# Patient Record
Sex: FEMALE | Race: WHITE | NOT HISPANIC OR LATINO | Employment: FULL TIME | URBAN - METROPOLITAN AREA
[De-identification: names, ages, dates, MRNs, and addresses within clinical notes are randomized per-mention and may not be internally consistent; named-entity substitution may affect disease eponyms.]

---

## 2018-01-11 ENCOUNTER — OFFICE VISIT CONVERTED (OUTPATIENT)
Dept: ORTHOPEDIC SURGERY | Facility: CLINIC | Age: 53
End: 2018-01-11
Attending: ORTHOPAEDIC SURGERY

## 2018-02-19 ENCOUNTER — CONVERSION ENCOUNTER (OUTPATIENT)
Dept: ORTHOPEDIC SURGERY | Facility: CLINIC | Age: 53
End: 2018-02-19

## 2018-02-19 ENCOUNTER — OFFICE VISIT CONVERTED (OUTPATIENT)
Dept: ORTHOPEDIC SURGERY | Facility: CLINIC | Age: 53
End: 2018-02-19
Attending: ORTHOPAEDIC SURGERY

## 2018-02-22 ENCOUNTER — CONVERSION ENCOUNTER (OUTPATIENT)
Dept: MAMMOGRAPHY | Facility: HOSPITAL | Age: 53
End: 2018-02-22

## 2018-03-19 ENCOUNTER — OFFICE VISIT CONVERTED (OUTPATIENT)
Dept: ORTHOPEDIC SURGERY | Facility: CLINIC | Age: 53
End: 2018-03-19
Attending: ORTHOPAEDIC SURGERY

## 2018-04-16 ENCOUNTER — CONSULT (OUTPATIENT)
Dept: ONCOLOGY | Facility: CLINIC | Age: 53
End: 2018-04-16

## 2018-04-16 ENCOUNTER — LAB (OUTPATIENT)
Dept: LAB | Facility: HOSPITAL | Age: 53
End: 2018-04-16

## 2018-04-16 VITALS
RESPIRATION RATE: 16 BRPM | OXYGEN SATURATION: 97 % | WEIGHT: 246.4 LBS | SYSTOLIC BLOOD PRESSURE: 136 MMHG | BODY MASS INDEX: 39.6 KG/M2 | TEMPERATURE: 98.7 F | DIASTOLIC BLOOD PRESSURE: 90 MMHG | HEART RATE: 79 BPM | HEIGHT: 66 IN

## 2018-04-16 DIAGNOSIS — M79.89 LEFT LEG SWELLING: ICD-10-CM

## 2018-04-16 DIAGNOSIS — I89.8: Primary | ICD-10-CM

## 2018-04-16 DIAGNOSIS — R59.0 LYMPHADENOPATHY OF LEFT CERVICAL REGION: ICD-10-CM

## 2018-04-16 DIAGNOSIS — R06.02 SHORTNESS OF BREATH: ICD-10-CM

## 2018-04-16 DIAGNOSIS — M79.89 LEFT ARM SWELLING: ICD-10-CM

## 2018-04-16 DIAGNOSIS — R59.0 LYMPHADENOPATHY OF LEFT CERVICAL REGION: Primary | ICD-10-CM

## 2018-04-16 LAB
BASOPHILS # BLD AUTO: 0.11 10*3/MM3 (ref 0–0.1)
BASOPHILS NFR BLD AUTO: 1.8 % (ref 0–1.1)
DEPRECATED RDW RBC AUTO: 42.2 FL (ref 37–49)
EOSINOPHIL # BLD AUTO: 0.23 10*3/MM3 (ref 0–0.36)
EOSINOPHIL NFR BLD AUTO: 3.7 % (ref 1–5)
ERYTHROCYTE [DISTWIDTH] IN BLOOD BY AUTOMATED COUNT: 13.6 % (ref 11.7–14.5)
HCT VFR BLD AUTO: 39 % (ref 34–45)
HGB BLD-MCNC: 12.5 G/DL (ref 11.5–14.9)
IMM GRANULOCYTES # BLD: 0.05 10*3/MM3 (ref 0–0.03)
IMM GRANULOCYTES NFR BLD: 0.8 % (ref 0–0.5)
LYMPHOCYTES # BLD AUTO: 2.43 10*3/MM3 (ref 1–3.5)
LYMPHOCYTES NFR BLD AUTO: 39.6 % (ref 20–49)
MCH RBC QN AUTO: 27.1 PG (ref 27–33)
MCHC RBC AUTO-ENTMCNC: 32.1 G/DL (ref 32–35)
MCV RBC AUTO: 84.4 FL (ref 83–97)
MONOCYTES # BLD AUTO: 0.51 10*3/MM3 (ref 0.25–0.8)
MONOCYTES NFR BLD AUTO: 8.3 % (ref 4–12)
NEUTROPHILS # BLD AUTO: 2.81 10*3/MM3 (ref 1.5–7)
NEUTROPHILS NFR BLD AUTO: 45.8 % (ref 39–75)
NRBC BLD MANUAL-RTO: 0 /100 WBC (ref 0–0)
PLATELET # BLD AUTO: 264 10*3/MM3 (ref 150–375)
PMV BLD AUTO: 10 FL (ref 8.9–12.1)
RBC # BLD AUTO: 4.62 10*6/MM3 (ref 3.9–5)
WBC NRBC COR # BLD: 6.14 10*3/MM3 (ref 4–10)

## 2018-04-16 PROCEDURE — 99245 OFF/OP CONSLTJ NEW/EST HI 55: CPT | Performed by: INTERNAL MEDICINE

## 2018-04-16 PROCEDURE — 85025 COMPLETE CBC W/AUTO DIFF WBC: CPT | Performed by: INTERNAL MEDICINE

## 2018-04-16 PROCEDURE — 36416 COLLJ CAPILLARY BLOOD SPEC: CPT | Performed by: INTERNAL MEDICINE

## 2018-04-16 RX ORDER — ERGOCALCIFEROL 1.25 MG/1
50000 CAPSULE ORAL WEEKLY
Refills: 5 | COMMUNITY
Start: 2018-04-10 | End: 2022-05-09

## 2018-04-16 NOTE — PROGRESS NOTES
Subjective     REASON FOR CONSULTATION:  Left arm swelling, left leg swelling, soa  Provide an opinion on any further workup or treatment                             REQUESTING PHYSICIAN:      RECORDS OBTAINED:  Records of the patients history including those obtained from the referring provider were reviewed and summarized in detail.    History of Present Illness   This is a pleasant 52-year-old woman seen today at the request of her primary care physician for evaluation of left arm and left leg swelling of unclear etiology.  Not a lot of evaluation has been performed to date.  Patient had surgery on the and left neck performed as an infant for resection of a lymphangioma.  She has chronic scarring of the left cervical neck and supraclavicular area.  The patient states that she has noted gradual increased swelling of the left arm for the previous 4-5 years.  When she measures her arms, the left is currently approximately 2 inches bigger than the right.  Swelling is not associated with redness, warmth or fever.  She has not noted left neck, supraclavicular, or axillary lymphadenopathy.  The patient had an injury to her left foot in November 2017, and since that time she has also noted swelling of the left leg.  She did have a Doppler ultrasound of the left leg that she reports was negative.  She denies having had a Doppler of the left arm.  The patient also complains of dyspnea, mostly with exertion.  She has no known cardiac history.  She has no prior history of thromboses.  She has mild abdominal bloating and indigestion for several months.  She denies having had significant weight loss or night sweats.    Past Medical History:   Diagnosis Date   • Anxiety    • CPAP (continuous positive airway pressure) dependence    • History of anemia    • History of hypoglycemia    • History of vitamin D deficiency    • OAB (overactive bladder)    • RICHARD (obstructive sleep apnea)         Past Surgical History:   Procedure  "Laterality Date   •  SECTION     • CHOLECYSTECTOMY     • DILATATION AND CURETTAGE     • HEMORRHOIDECTOMY     • LYMPH NODE DISSECTION     • ROTATOR CUFF REPAIR Right    • SHOULDER SURGERY Left 2018        No current outpatient prescriptions on file prior to visit.     No current facility-administered medications on file prior to visit.         ALLERGIES:    Allergies   Allergen Reactions   • Tylenol [Acetaminophen] Unknown (See Comments)     Pt stated        Social History     Social History   • Marital status:      Social History Main Topics   • Smoking status: Never Smoker   • Drug use: Unknown     Other Topics Concern   • Not on file        Family History   Problem Relation Age of Onset   • Diabetes Mother    • Diabetes Maternal Aunt    • Breast cancer Maternal Aunt    • Diabetes Other    • Hyperlipidemia Other    • Hypertension Other    • Lung cancer Other         Review of Systems   Constitutional: Negative.    HENT: Negative.    Eyes: Positive for visual disturbance.   Respiratory: Positive for shortness of breath. Negative for chest tightness.    Cardiovascular: Positive for leg swelling. Negative for chest pain and palpitations.   Gastrointestinal: Negative for blood in stool, constipation, diarrhea, nausea and vomiting.        Bloating   Genitourinary: Negative.    Musculoskeletal: Positive for arthralgias.   Skin: Negative.    Neurological: Negative.    Hematological: Negative.    Psychiatric/Behavioral: Negative.           Objective     Vitals:    18 0812   BP: 136/90   Pulse: 79   Resp: 16   Temp: 98.7 °F (37.1 °C)   SpO2: 97%  Comment: at rest   Weight: 112 kg (246 lb 6.4 oz)   Height: 168 cm (66.14\")   PainSc: 0-No pain     Current Status 2018   ECOG score 1       Physical Exam    CON: pleasant, obese, well-developed  HEENT: no icterus, no thrush, moist membranes  NECK: no jvd, surgical scars left neck/supraclavicular  LN: no cervical or supraclavicular or " axillary lad  CV: RRR, S1S2  RESP: CTA bilat, no wheezing, no rales  GI: soft, nontender, no masses, +bs  MS: large extremities, LUE larger than RUE, LLE mildly larger RLE  NEURO: alert and oriented, strength normal  SKIN: no petechiae or bruising  PSYCH: normal mood    RECENT LABS:  Hematology WBC   Date Value Ref Range Status   04/16/2018 6.14 4.00 - 10.00 10*3/mm3 Final     RBC   Date Value Ref Range Status   04/16/2018 4.62 3.90 - 5.00 10*6/mm3 Final     Hemoglobin   Date Value Ref Range Status   04/16/2018 12.5 11.5 - 14.9 g/dL Final     Hematocrit   Date Value Ref Range Status   04/16/2018 39.0 34.0 - 45.0 % Final     Platelets   Date Value Ref Range Status   04/16/2018 264 150 - 375 10*3/mm3 Final          Assessment/Plan     This is a 52-year-old woman seen today for evaluation of left arm and left leg swelling of unclear etiology.  The left arm has been progressively swelling the last 4-5 years and the left leg since November 2017.  She has had a Doppler ultrasound of the left leg negative but no evaluation of the left arm.  She has no thrombotic history.  She did have surgery on the left neck for removal of a lymphangioma as a child.  She has no known cardiac history.  Associated symptoms include mild dyspnea on exertion and abdominal bloating but no weight loss, severe night sweats, fatigue, fever, or chills.    At this time, I am not sure the cause of the patient's left arm and left leg swelling.  She may have lymphedema of the left arm related to her previous lymph node surgery.  A thoracic outlet syndrome I think would be on the differential although she is having no pain or neurogenic symptoms.  Chronic thrombosis should be considered.  Central venous obstruction should be considered.    At this time, I have recommended a Doppler ultrasound of the left arm and left leg.  I recommended a CT scan of the neck, chest, abdomen and pelvis to evaluate for lymphadenopathy, central venous occlusions, thoracic  outlet syndrome etc.  I will call the patient with these results.  If evaluation is negative for thrombosis or lymphadenopathy, I think evaluation by a vascular surgeon would be the next step in evaluation.    Addendum: I updated the patient with results by phone.  Doppler ultrasound of the left arm and left leg were both negative for DVT.  CT scan of the neck, chest, abdomen, and pelvis showed a small enlarged lymph node in the right neck 1.7 cm for which 3-4 month follow-up was recommended.  The left internal jugular vein was enlarged compared to the right.  The chest showed a mildly prominent prevascular lymph node 1.2 with calcification.  There was no evidence of central vascular obstruction.  No pathologic axillary lymph nodes were seen.  There were numerous low-density lesions in the spleen with a normal size spleen 10.9 cm.  Given the calcification of lymph nodes in the chest, I favor this to be granulomatous.  I recommended follow-up CT scan of the neck, chest, abdomen and pelvis in 4 months along with a repeat CBC and CMP.  I do not have an explanation for the patient's left arm and left leg swelling.  I recommended a vascular surgery evaluation to exclude vascular causes.

## 2018-04-18 ENCOUNTER — HOSPITAL ENCOUNTER (OUTPATIENT)
Dept: CT IMAGING | Facility: HOSPITAL | Age: 53
Discharge: HOME | End: 2018-04-18
Attending: INTERNAL MEDICINE | Admitting: INTERNAL MEDICINE

## 2018-04-18 ENCOUNTER — HOSPITAL ENCOUNTER (OUTPATIENT)
Dept: CARDIOLOGY | Facility: HOSPITAL | Age: 53
Discharge: HOME | End: 2018-04-18
Attending: INTERNAL MEDICINE

## 2018-04-18 DIAGNOSIS — R06.02 SHORTNESS OF BREATH: ICD-10-CM

## 2018-04-18 DIAGNOSIS — M79.89 LEFT ARM SWELLING: ICD-10-CM

## 2018-04-18 DIAGNOSIS — M79.89 LEFT LEG SWELLING: ICD-10-CM

## 2018-04-18 DIAGNOSIS — R59.0 LYMPHADENOPATHY OF LEFT CERVICAL REGION: ICD-10-CM

## 2018-04-18 LAB
BH CV LOWER VASCULAR LEFT COMMON FEMORAL AUGMENT: NORMAL
BH CV LOWER VASCULAR LEFT COMMON FEMORAL COMPETENT: NORMAL
BH CV LOWER VASCULAR LEFT COMMON FEMORAL COMPRESS: NORMAL
BH CV LOWER VASCULAR LEFT COMMON FEMORAL PHASIC: NORMAL
BH CV LOWER VASCULAR LEFT COMMON FEMORAL SPONT: NORMAL
BH CV LOWER VASCULAR LEFT DISTAL FEMORAL COMPRESS: NORMAL
BH CV LOWER VASCULAR LEFT GASTRONEMIUS COMPRESS: NORMAL
BH CV LOWER VASCULAR LEFT GREATER SAPH AK COMPRESS: NORMAL
BH CV LOWER VASCULAR LEFT GREATER SAPH BK COMPRESS: NORMAL
BH CV LOWER VASCULAR LEFT LESSER SAPH COMPRESS: NORMAL
BH CV LOWER VASCULAR LEFT MID FEMORAL AUGMENT: NORMAL
BH CV LOWER VASCULAR LEFT MID FEMORAL COMPETENT: NORMAL
BH CV LOWER VASCULAR LEFT MID FEMORAL COMPRESS: NORMAL
BH CV LOWER VASCULAR LEFT MID FEMORAL PHASIC: NORMAL
BH CV LOWER VASCULAR LEFT MID FEMORAL SPONT: NORMAL
BH CV LOWER VASCULAR LEFT PERONEAL COMPRESS: NORMAL
BH CV LOWER VASCULAR LEFT POPLITEAL AUGMENT: NORMAL
BH CV LOWER VASCULAR LEFT POPLITEAL COMPETENT: NORMAL
BH CV LOWER VASCULAR LEFT POPLITEAL COMPRESS: NORMAL
BH CV LOWER VASCULAR LEFT POPLITEAL PHASIC: NORMAL
BH CV LOWER VASCULAR LEFT POPLITEAL SPONT: NORMAL
BH CV LOWER VASCULAR LEFT POSTERIOR TIBIAL COMPRESS: NORMAL
BH CV LOWER VASCULAR LEFT PROXIMAL FEMORAL COMPRESS: NORMAL
BH CV LOWER VASCULAR LEFT SAPHENOFEMORAL JUNCTION AUGMENT: NORMAL
BH CV LOWER VASCULAR LEFT SAPHENOFEMORAL JUNCTION COMPETENT: NORMAL
BH CV LOWER VASCULAR LEFT SAPHENOFEMORAL JUNCTION COMPRESS: NORMAL
BH CV LOWER VASCULAR LEFT SAPHENOFEMORAL JUNCTION PHASIC: NORMAL
BH CV LOWER VASCULAR LEFT SAPHENOFEMORAL JUNCTION SPONT: NORMAL
BH CV LOWER VASCULAR RIGHT COMMON FEMORAL AUGMENT: NORMAL
BH CV LOWER VASCULAR RIGHT COMMON FEMORAL COMPETENT: NORMAL
BH CV LOWER VASCULAR RIGHT COMMON FEMORAL COMPRESS: NORMAL
BH CV LOWER VASCULAR RIGHT COMMON FEMORAL PHASIC: NORMAL
BH CV LOWER VASCULAR RIGHT COMMON FEMORAL SPONT: NORMAL
BH CV UPPER VENOUS LEFT AXILLARY AUGMENT: NORMAL
BH CV UPPER VENOUS LEFT AXILLARY COMPETENT: NORMAL
BH CV UPPER VENOUS LEFT AXILLARY COMPRESS: NORMAL
BH CV UPPER VENOUS LEFT AXILLARY PHASIC: NORMAL
BH CV UPPER VENOUS LEFT AXILLARY SPONT: NORMAL
BH CV UPPER VENOUS LEFT BASILIC FOREARM COMPRESS: NORMAL
BH CV UPPER VENOUS LEFT BASILIC UPPER COMPRESS: NORMAL
BH CV UPPER VENOUS LEFT BRACHIAL COMPRESS: NORMAL
BH CV UPPER VENOUS LEFT CEPHALIC FOREARM COMPRESS: NORMAL
BH CV UPPER VENOUS LEFT CEPHALIC UPPER COMPRESS: NORMAL
BH CV UPPER VENOUS LEFT INTERNAL JUGULAR AUGMENT: NORMAL
BH CV UPPER VENOUS LEFT INTERNAL JUGULAR COMPETENT: NORMAL
BH CV UPPER VENOUS LEFT INTERNAL JUGULAR COMPRESS: NORMAL
BH CV UPPER VENOUS LEFT INTERNAL JUGULAR PHASIC: NORMAL
BH CV UPPER VENOUS LEFT INTERNAL JUGULAR SPONT: NORMAL
BH CV UPPER VENOUS LEFT RADIAL COMPRESS: NORMAL
BH CV UPPER VENOUS LEFT SUBCLAVIAN AUGMENT: NORMAL
BH CV UPPER VENOUS LEFT SUBCLAVIAN COMPETENT: NORMAL
BH CV UPPER VENOUS LEFT SUBCLAVIAN PHASIC: NORMAL
BH CV UPPER VENOUS LEFT SUBCLAVIAN SPONT: NORMAL
BH CV UPPER VENOUS LEFT ULNAR COMPRESS: NORMAL
BH CV UPPER VENOUS RIGHT INTERNAL JUGULAR AUGMENT: NORMAL
BH CV UPPER VENOUS RIGHT INTERNAL JUGULAR COMPETENT: NORMAL
BH CV UPPER VENOUS RIGHT INTERNAL JUGULAR COMPRESS: NORMAL
BH CV UPPER VENOUS RIGHT INTERNAL JUGULAR PHASIC: NORMAL
BH CV UPPER VENOUS RIGHT INTERNAL JUGULAR SPONT: NORMAL
BH CV UPPER VENOUS RIGHT SUBCLAVIAN AUGMENT: NORMAL
BH CV UPPER VENOUS RIGHT SUBCLAVIAN COMPETENT: NORMAL
BH CV UPPER VENOUS RIGHT SUBCLAVIAN PHASIC: NORMAL
BH CV UPPER VENOUS RIGHT SUBCLAVIAN SPONT: NORMAL
CREAT BLDA-MCNC: 0.9 MG/DL (ref 0.6–1.3)

## 2018-04-18 PROCEDURE — 71260 CT THORAX DX C+: CPT

## 2018-04-18 PROCEDURE — 25010000002 IOPAMIDOL 61 % SOLUTION: Performed by: INTERNAL MEDICINE

## 2018-04-18 PROCEDURE — 93971 EXTREMITY STUDY: CPT

## 2018-04-18 PROCEDURE — 74177 CT ABD & PELVIS W/CONTRAST: CPT

## 2018-04-18 PROCEDURE — 82565 ASSAY OF CREATININE: CPT

## 2018-04-18 PROCEDURE — 70491 CT SOFT TISSUE NECK W/DYE: CPT

## 2018-04-18 RX ADMIN — IOPAMIDOL 85 ML: 612 INJECTION, SOLUTION INTRAVENOUS at 15:15

## 2018-04-19 ENCOUNTER — OFFICE VISIT CONVERTED (OUTPATIENT)
Dept: ORTHOPEDIC SURGERY | Facility: CLINIC | Age: 53
End: 2018-04-19
Attending: ORTHOPAEDIC SURGERY

## 2018-04-23 ENCOUNTER — TELEPHONE (OUTPATIENT)
Dept: GENERAL RADIOLOGY | Facility: HOSPITAL | Age: 53
End: 2018-04-23

## 2018-04-23 NOTE — TELEPHONE ENCOUNTER
----- Message from Magdiel Montoya MD sent at 4/23/2018  8:51 AM EDT -----  This patient needs a consult with vascular surgery dr bhardwaj or partner.  She also needs a CT scan of neck, chest, abdomen, and pelvis in 4 months with a CBC and see me the week after 1 unit.

## 2018-05-31 ENCOUNTER — OFFICE VISIT CONVERTED (OUTPATIENT)
Dept: ORTHOPEDIC SURGERY | Facility: CLINIC | Age: 53
End: 2018-05-31
Attending: ORTHOPAEDIC SURGERY

## 2018-08-06 ENCOUNTER — TELEPHONE (OUTPATIENT)
Dept: GENERAL RADIOLOGY | Facility: HOSPITAL | Age: 53
End: 2018-08-06

## 2018-08-13 ENCOUNTER — APPOINTMENT (OUTPATIENT)
Dept: ONCOLOGY | Facility: CLINIC | Age: 53
End: 2018-08-13

## 2018-08-13 ENCOUNTER — APPOINTMENT (OUTPATIENT)
Dept: LAB | Facility: HOSPITAL | Age: 53
End: 2018-08-13

## 2018-10-04 ENCOUNTER — OFFICE VISIT CONVERTED (OUTPATIENT)
Dept: ORTHOPEDIC SURGERY | Facility: CLINIC | Age: 53
End: 2018-10-04
Attending: ORTHOPAEDIC SURGERY

## 2018-11-12 ENCOUNTER — OFFICE VISIT CONVERTED (OUTPATIENT)
Dept: ORTHOPEDIC SURGERY | Facility: CLINIC | Age: 53
End: 2018-11-12
Attending: ORTHOPAEDIC SURGERY

## 2018-12-27 ENCOUNTER — OFFICE VISIT CONVERTED (OUTPATIENT)
Dept: ORTHOPEDIC SURGERY | Facility: CLINIC | Age: 53
End: 2018-12-27
Attending: ORTHOPAEDIC SURGERY

## 2019-01-11 ENCOUNTER — HOSPITAL ENCOUNTER (OUTPATIENT)
Dept: URGENT CARE | Facility: CLINIC | Age: 54
Discharge: HOME | End: 2019-01-11
Attending: PHYSICIAN ASSISTANT

## 2019-03-28 ENCOUNTER — CONVERSION ENCOUNTER (OUTPATIENT)
Dept: ORTHOPEDIC SURGERY | Facility: CLINIC | Age: 54
End: 2019-03-28

## 2019-03-28 ENCOUNTER — OFFICE VISIT CONVERTED (OUTPATIENT)
Dept: ORTHOPEDIC SURGERY | Facility: CLINIC | Age: 54
End: 2019-03-28
Attending: ORTHOPAEDIC SURGERY

## 2019-05-23 ENCOUNTER — OFFICE VISIT CONVERTED (OUTPATIENT)
Dept: ORTHOPEDIC SURGERY | Facility: CLINIC | Age: 54
End: 2019-05-23
Attending: ORTHOPAEDIC SURGERY

## 2019-05-23 ENCOUNTER — CONVERSION ENCOUNTER (OUTPATIENT)
Dept: ORTHOPEDIC SURGERY | Facility: CLINIC | Age: 54
End: 2019-05-23

## 2019-06-03 ENCOUNTER — HOSPITAL ENCOUNTER (OUTPATIENT)
Dept: MAMMOGRAPHY | Facility: HOSPITAL | Age: 54
Discharge: HOME | End: 2019-06-03

## 2019-08-22 ENCOUNTER — HOSPITAL ENCOUNTER (OUTPATIENT)
Dept: URGENT CARE | Facility: CLINIC | Age: 54
Discharge: HOME | End: 2019-08-22

## 2019-09-02 ENCOUNTER — HOSPITAL ENCOUNTER (OUTPATIENT)
Dept: URGENT CARE | Facility: CLINIC | Age: 54
Discharge: HOME | End: 2019-09-02

## 2019-12-12 ENCOUNTER — HOSPITAL ENCOUNTER (OUTPATIENT)
Dept: URGENT CARE | Facility: CLINIC | Age: 54
Discharge: HOME | End: 2019-12-12
Attending: FAMILY MEDICINE

## 2020-04-20 ENCOUNTER — HOSPITAL ENCOUNTER (OUTPATIENT)
Dept: URGENT CARE | Facility: CLINIC | Age: 55
Discharge: HOME | End: 2020-04-20

## 2020-06-30 ENCOUNTER — HOSPITAL ENCOUNTER (OUTPATIENT)
Dept: LAB | Facility: HOSPITAL | Age: 55
Discharge: HOME | End: 2020-06-30
Attending: NURSE PRACTITIONER

## 2020-06-30 ENCOUNTER — OFFICE VISIT CONVERTED (OUTPATIENT)
Dept: FAMILY MEDICINE CLINIC | Facility: CLINIC | Age: 55
End: 2020-06-30
Attending: NURSE PRACTITIONER

## 2020-06-30 LAB
ALBUMIN SERPL-MCNC: 4.2 G/DL (ref 3.5–5)
ALBUMIN/GLOB SERPL: 1.3 {RATIO} (ref 1.4–2.6)
ALP SERPL-CCNC: 115 U/L (ref 53–141)
ALT SERPL-CCNC: 21 U/L (ref 10–40)
ANION GAP SERPL CALC-SCNC: 23 MMOL/L (ref 8–19)
AST SERPL-CCNC: 18 U/L (ref 15–50)
BASOPHILS # BLD AUTO: 0.08 10*3/UL (ref 0–0.2)
BASOPHILS NFR BLD AUTO: 1.4 % (ref 0–3)
BILIRUB SERPL-MCNC: 0.48 MG/DL (ref 0.2–1.3)
BUN SERPL-MCNC: 14 MG/DL (ref 5–25)
BUN/CREAT SERPL: 15 {RATIO} (ref 6–20)
CALCIUM SERPL-MCNC: 9.4 MG/DL (ref 8.7–10.4)
CHLORIDE SERPL-SCNC: 106 MMOL/L (ref 99–111)
CHOLEST SERPL-MCNC: 195 MG/DL (ref 107–200)
CHOLEST/HDLC SERPL: 3.1 {RATIO} (ref 3–6)
CONV ABS IMM GRAN: 0.01 10*3/UL (ref 0–0.2)
CONV CO2: 18 MMOL/L (ref 22–32)
CONV IMMATURE GRAN: 0.2 % (ref 0–1.8)
CONV TOTAL PROTEIN: 7.4 G/DL (ref 6.3–8.2)
CREAT UR-MCNC: 0.91 MG/DL (ref 0.5–0.9)
DEPRECATED RDW RBC AUTO: 43.5 FL (ref 36.4–46.3)
EOSINOPHIL # BLD AUTO: 0.12 10*3/UL (ref 0–0.7)
EOSINOPHIL # BLD AUTO: 2.2 % (ref 0–7)
ERYTHROCYTE [DISTWIDTH] IN BLOOD BY AUTOMATED COUNT: 13.4 % (ref 11.7–14.4)
GFR SERPLBLD BASED ON 1.73 SQ M-ARVRAT: >60 ML/MIN/{1.73_M2}
GLOBULIN UR ELPH-MCNC: 3.2 G/DL (ref 2–3.5)
GLUCOSE SERPL-MCNC: 78 MG/DL (ref 65–99)
HCT VFR BLD AUTO: 44 % (ref 37–47)
HDLC SERPL-MCNC: 63 MG/DL (ref 40–60)
HGB BLD-MCNC: 13.4 G/DL (ref 12–16)
LDLC SERPL CALC-MCNC: 108 MG/DL (ref 70–100)
LYMPHOCYTES # BLD AUTO: 2.13 10*3/UL (ref 1–5)
LYMPHOCYTES NFR BLD AUTO: 38.4 % (ref 20–45)
MCH RBC QN AUTO: 27.1 PG (ref 27–31)
MCHC RBC AUTO-ENTMCNC: 30.5 G/DL (ref 33–37)
MCV RBC AUTO: 89.1 FL (ref 81–99)
MONOCYTES # BLD AUTO: 0.35 10*3/UL (ref 0.2–1.2)
MONOCYTES NFR BLD AUTO: 6.3 % (ref 3–10)
NEUTROPHILS # BLD AUTO: 2.85 10*3/UL (ref 2–8)
NEUTROPHILS NFR BLD AUTO: 51.5 % (ref 30–85)
NRBC CBCN: 0 % (ref 0–0.7)
OSMOLALITY SERPL CALC.SUM OF ELEC: 295 MOSM/KG (ref 273–304)
PLATELET # BLD AUTO: 267 10*3/UL (ref 130–400)
PMV BLD AUTO: 11.1 FL (ref 9.4–12.3)
POTASSIUM SERPL-SCNC: 3.8 MMOL/L (ref 3.5–5.3)
RBC # BLD AUTO: 4.94 10*6/UL (ref 4.2–5.4)
SODIUM SERPL-SCNC: 143 MMOL/L (ref 135–147)
TRIGL SERPL-MCNC: 122 MG/DL (ref 40–150)
TSH SERPL-ACNC: 3.41 M[IU]/L (ref 0.27–4.2)
VIT B12 SERPL-MCNC: 362 PG/ML (ref 211–911)
VLDLC SERPL-MCNC: 24 MG/DL (ref 5–37)
WBC # BLD AUTO: 5.54 10*3/UL (ref 4.8–10.8)

## 2020-07-01 LAB
FSH SERPL-ACNC: 52.6 M[IU]/ML
LH SERPL-ACNC: 27.3 M[IU]/ML
PROGEST SERPL-MCNC: <0.1 NG/ML

## 2020-09-17 ENCOUNTER — HOSPITAL ENCOUNTER (OUTPATIENT)
Dept: MAMMOGRAPHY | Facility: HOSPITAL | Age: 55
Discharge: HOME | End: 2020-09-17
Attending: NURSE PRACTITIONER

## 2020-10-06 ENCOUNTER — OFFICE VISIT CONVERTED (OUTPATIENT)
Dept: FAMILY MEDICINE CLINIC | Facility: CLINIC | Age: 55
End: 2020-10-06
Attending: NURSE PRACTITIONER

## 2020-10-06 ENCOUNTER — HOSPITAL ENCOUNTER (OUTPATIENT)
Dept: GENERAL RADIOLOGY | Facility: HOSPITAL | Age: 55
Discharge: HOME | End: 2020-10-06
Attending: NURSE PRACTITIONER

## 2020-10-06 ENCOUNTER — HOSPITAL ENCOUNTER (OUTPATIENT)
Dept: LAB | Facility: HOSPITAL | Age: 55
Discharge: HOME | End: 2020-10-06
Attending: NURSE PRACTITIONER

## 2020-10-06 LAB
25(OH)D3 SERPL-MCNC: 18.2 NG/ML (ref 30–100)
ALBUMIN SERPL-MCNC: 4.1 G/DL (ref 3.5–5)
ALBUMIN/GLOB SERPL: 1.6 {RATIO} (ref 1.4–2.6)
ALP SERPL-CCNC: 109 U/L (ref 53–141)
ALT SERPL-CCNC: 23 U/L (ref 10–40)
AMYLASE SERPL-CCNC: 31 U/L (ref 30–110)
ANION GAP SERPL CALC-SCNC: 17 MMOL/L (ref 8–19)
AST SERPL-CCNC: 24 U/L (ref 15–50)
BASOPHILS # BLD AUTO: 0.06 10*3/UL (ref 0–0.2)
BASOPHILS NFR BLD AUTO: 1.2 % (ref 0–3)
BILIRUB SERPL-MCNC: 0.49 MG/DL (ref 0.2–1.3)
BNP SERPL-MCNC: 37 PG/ML (ref 0–900)
BUN SERPL-MCNC: 14 MG/DL (ref 5–25)
BUN/CREAT SERPL: 17 {RATIO} (ref 6–20)
CALCIUM SERPL-MCNC: 8.8 MG/DL (ref 8.7–10.4)
CHLORIDE SERPL-SCNC: 103 MMOL/L (ref 99–111)
CONV ABS IMM GRAN: 0.01 10*3/UL (ref 0–0.2)
CONV CO2: 21 MMOL/L (ref 22–32)
CONV IMMATURE GRAN: 0.2 % (ref 0–1.8)
CONV TOTAL PROTEIN: 6.7 G/DL (ref 6.3–8.2)
CREAT UR-MCNC: 0.84 MG/DL (ref 0.5–0.9)
DEPRECATED RDW RBC AUTO: 42.5 FL (ref 36.4–46.3)
EOSINOPHIL # BLD AUTO: 0.08 10*3/UL (ref 0–0.7)
EOSINOPHIL # BLD AUTO: 1.6 % (ref 0–7)
ERYTHROCYTE [DISTWIDTH] IN BLOOD BY AUTOMATED COUNT: 13.5 % (ref 11.7–14.4)
GFR SERPLBLD BASED ON 1.73 SQ M-ARVRAT: >60 ML/MIN/{1.73_M2}
GLOBULIN UR ELPH-MCNC: 2.6 G/DL (ref 2–3.5)
GLUCOSE SERPL-MCNC: 93 MG/DL (ref 65–99)
HCT VFR BLD AUTO: 41.6 % (ref 37–47)
HGB BLD-MCNC: 13.1 G/DL (ref 12–16)
LIPASE SERPL-CCNC: 18 U/L (ref 5–51)
LYMPHOCYTES # BLD AUTO: 2.26 10*3/UL (ref 1–5)
LYMPHOCYTES NFR BLD AUTO: 45.8 % (ref 20–45)
MAGNESIUM SERPL-MCNC: 1.83 MG/DL (ref 1.6–2.3)
MCH RBC QN AUTO: 27.2 PG (ref 27–31)
MCHC RBC AUTO-ENTMCNC: 31.5 G/DL (ref 33–37)
MCV RBC AUTO: 86.5 FL (ref 81–99)
MONOCYTES # BLD AUTO: 0.34 10*3/UL (ref 0.2–1.2)
MONOCYTES NFR BLD AUTO: 6.9 % (ref 3–10)
NEUTROPHILS # BLD AUTO: 2.18 10*3/UL (ref 2–8)
NEUTROPHILS NFR BLD AUTO: 44.3 % (ref 30–85)
NRBC CBCN: 0 % (ref 0–0.7)
OSMOLALITY SERPL CALC.SUM OF ELEC: 284 MOSM/KG (ref 273–304)
PLATELET # BLD AUTO: 268 10*3/UL (ref 130–400)
PMV BLD AUTO: 10.3 FL (ref 9.4–12.3)
POTASSIUM SERPL-SCNC: 3.6 MMOL/L (ref 3.5–5.3)
RBC # BLD AUTO: 4.81 10*6/UL (ref 4.2–5.4)
SODIUM SERPL-SCNC: 137 MMOL/L (ref 135–147)
WBC # BLD AUTO: 4.93 10*3/UL (ref 4.8–10.8)

## 2020-10-12 ENCOUNTER — HOSPITAL ENCOUNTER (OUTPATIENT)
Dept: LAB | Facility: HOSPITAL | Age: 55
Discharge: HOME | End: 2020-10-12
Attending: NURSE PRACTITIONER

## 2020-10-12 LAB
C DIFF TOX B STL QL CT TISS CULT: NEGATIVE
CONV 027 TOXIN: NEGATIVE

## 2020-10-14 LAB — BACTERIA SPEC AEROBE CULT: NORMAL

## 2020-10-22 ENCOUNTER — HOSPITAL ENCOUNTER (OUTPATIENT)
Dept: CARDIOLOGY | Facility: HOSPITAL | Age: 55
Discharge: HOME | End: 2020-10-22
Attending: NURSE PRACTITIONER

## 2020-11-03 ENCOUNTER — OFFICE VISIT CONVERTED (OUTPATIENT)
Dept: FAMILY MEDICINE CLINIC | Facility: CLINIC | Age: 55
End: 2020-11-03
Attending: NURSE PRACTITIONER

## 2020-12-30 ENCOUNTER — HOSPITAL ENCOUNTER (OUTPATIENT)
Dept: CARDIOLOGY | Facility: HOSPITAL | Age: 55
Discharge: HOME | End: 2020-12-30
Attending: NURSE PRACTITIONER

## 2021-02-26 ENCOUNTER — HOSPITAL ENCOUNTER (OUTPATIENT)
Dept: URGENT CARE | Facility: CLINIC | Age: 56
Discharge: HOME | End: 2021-02-26
Attending: FAMILY MEDICINE

## 2021-05-10 NOTE — H&P
History and Physical      Patient Name: Carlota Hare   Patient ID: [id number]   Sex: Female   YOB: 1965    Primary Care Provider: Ruchi SAMUELS   Referring Provider: Ruchi SAMUELS    Visit Date: June 30, 2020    Provider: ARVIND Conn   Location: [facility]   Location Address: [address] [place] [address], [address]  [place], [place]  [id number]   Location Phone: [phone]          Chief Complaint  · pt here to establish care  · Annual Physical Exam      History Of Present Illness  Carlota Hare is a 55 year old /White female who presents for evaluation and treatment of:      Annual Physical exam    New pt to establish care    Moodiness, possible hormone imbalance.    pt here to discuss possible hormone imbalances. States she is becooming very gonzalez and irritable. States things that should not bother her are making her angry and mad. She states she feels like she 'just dont care'. She denies hot flashes, but states she jsut feels hot in general. She does c/o fatigue. Denies any night sweats. She has had a uterine ablation in the past, so has not had a menses since then. She states she has history of sleep apnea and is suppossed to be using a Cpap machine, but she never could sleep well with it and so she stopped using. She was on medication in the past for anxiety and depression, but she had a lot of weight gain and so she stopped taking it.            Past Medical History  Disease Name Date Onset Notes   Acute cystitis 01/04/2016 --    Aftercare following surgery of the musculoskeletal system, left knee arthroscopy 11/15/2018 --    Aftercare following surgery of the musculoskeletal system-- left shoulder rotator cuff repair 04/24/2018 --    Aftercare following surgery of the musculoskeletal system-- status post left shoulder arthroscopy 03/23/2018 --    Anemia, Unspecified --  --    Ankle fracture, left 12/19/2017 --    Bladder Disorder --  --    Depression --  --    Healing  Fracture--Left ankle- distal fibula 11/22/2017 --    Hypertension --  --    Hypoglycemia --  --    Kidney stone 02/19/2018 --    Left ankle pain, unspecified chronicity 11/22/2017 --    Left knee pain, unspecified chronicity 11/15/2018 --    Left shoulder pain, unspecified chronicity 03/23/2018 --    Limb Swelling --  --    Medial meniscus tear, left knee 02/20/2018 --    Mixed Incontinence 03/06/2016 --    Nocturia 01/04/2016 --    Overactive bladder 10/19/2016 --    Pain, Low Back --  --    Pain: Knee 02/20/2018 --    Pain: Shoulder 02/20/2018 --    Rectal bleeding --  --    Resolved Hematuria 11/06/2015 --    Sciatica --  --    Seasonal allergies --  --    Shoulder: Rotator cuff tear, left 02/20/2018 --    Sleep apnea, unspecified --  --    Sprain: Ankle--left 11/22/2017 --    Swelling, left leg 01/12/2018 --    Swelling--left leg 01/11/2018 --    Urgency of micturition 01/04/2016 --    Urinary frequency 10/30/2015 --    Urinary Incontinence 03/06/2016 --          Past Surgical History  Procedure Name Date Notes   *Other --  Cystic fibrosis surgery on neck as a baby   Cesarian Section 10- --    Cholecystectomy --  --    Colonoscopy --  --    Cysto with Bladder Botox --  --    Cystoscopy --  with bladder botox   Gallbladder --  --    Hemorrhoidectomy 8-2010 --    Joint Surgery --  --    Repair of right rotator cuff --  --    Uterine ablation --  --          Medication List  Name Date Started Instructions   Daily Multiple  mg-mcg oral tablet  take 1 tablet by oral route once daily with food         Allergy List  Allergen Name Date Reaction Notes   Codeine Phosphate --  tired/super hyper --    Codeine Sulfate --  --  --    Demerol --  --  --    Lortab --  --  --    Percocet --  --  --    tramadol --  --  --    Tylenol --  --  ashwini /headache   Tylenol-Codeine #3 --  --  --        Allergies Reconciled  Family Medical History  Disease Name Relative/Age Notes   Breast Neoplasm, Malignant  --    Lung  Neoplasm, Malignant  --    Heart Disease Father/   Father   Hyperlipidemia  --    Cancer, Unspecified Brother/  Father/  Sister/   Father; Sister; Brother  Mother   Diabetes, unspecified type Father/  Mother/   Mother; Father  Mother; Father  Mother; Father  Mother; Grandmother (maternal); Aunt (maternal); Grandmother (paternal); Aunt (paternal)   Hypertension  --    - No Family History of Colorectal Cancer  --    Family history of breast cancer  Aunt/40s   Blood Diseases Brother/  Son/   Brother; Son   Family history of certain chronic disabling diseases; arthritis Mother/   Mother   Osteoporosis Daughter/  Mother/  Son/   Mother; Daughter; Son  Mother   Family history of Arthritis Brother/  Mother/  Sister/   Mother; Sister; Brother         Reproductive History  Menstrual   Pregnancy Summary   Total Pregnancies: 1 Full Term: 1 Premature: 0   Ab Induced: 0 Ab Spontaneous: 0 Ectopics: 0   Multiples: 0 Livin         Social History  Finding Status Start/Stop Quantity Notes   Alcohol Never 0/0 --  drinks no   Alcohol Use Never --/-- --  does not drink   Caffeine Current some day 0/0 --  drinks occasionally; coffee and soft drinks; 1-2 times per day   Claustophobic Unknown --/-- --  yes   lives with children --  --/-- --  --    lives with spouse --  --/-- --  --    . --  --/-- --  --    Recreational Drug Use Never --/-- --  no   Second hand smoke exposure Current some day 0/0 --  yes   Tobacco Never --/-- --  never smoker  never smoker  never smoker  never a smoker   Working --  --/-- --  --          Review of Systems  · Constitutional  o Denies  o : fatigue, night sweats  · Eyes  o Denies  o : double vision, blurred vision  · HENT  o Denies  o : vertigo, recent head injury  · Breasts  o Denies  o : abnormal changes in breast size, additional breast symptoms except as noted in the HPI  · Cardiovascular  o Denies  o : chest pain, irregular heart beats  · Respiratory  o Denies  o : shortness of breath,  "productive cough  · Gastrointestinal  o Denies  o : nausea, vomiting  · Genitourinary  o Denies  o : dysuria, urinary retention  · Integument  o Denies  o : hair growth change, new skin lesions  · Neurologic  o Denies  o : altered mental status, seizures  · Musculoskeletal  o Denies  o : joint swelling, limitation of motion  · Endocrine  o Denies  o : cold intolerance, heat intolerance  · Psychiatric  o Admits  o : withdrawn  · Heme-Lymph  o Denies  o : petechiae, lymph node enlargement or tenderness  · Allergic-Immunologic  o Denies  o : frequent illnesses      Vitals  Date Time BP Position Site L\R Cuff Size HR RR TEMP (F) WT  HT  BMI kg/m2 BSA m2 O2 Sat HC       06/30/2020 09:55 /83 Sitting    75 - R  98.4 263lbs 0oz 5'  6\" 42.45 2.36 100 %          Physical Examination  · Constitutional  o Appearance  o : well developed, well-nourished, no acute distress  · Head and Face  o Head  o : normocephalic, atraumatic  · Neck  o Inspection/Palpation  o : normal appearance, no masses or tenderness, trachea midline  o Thyroid  o : gland size normal, nontender, no nodules or masses present on palpation  · Respiratory  o Respiratory Effort  o : breathing unlabored  o Inspection of Chest  o : chest rise symmetric bilaterally  o Auscultation of Lungs  o : clear to auscultation bilaterally throughout inspiration and expiration  · Cardiovascular  o Heart  o :   § Auscultation of Heart  § : regular rate and rhythm, no murmurs, gallops or rubs  o Peripheral Vascular System  o :   § Extremities  § : no edema  · Lymphatic  o Neck  o : no cervical lymphadenopathy, no supraclavicular lymphadenopathy  · Psychiatric  o Mood and Affect  o : mood normal, affect appropriate              Assessment  · Annual physical exam     V70.0/Z00.00  · Fatigue     780.79/R53.83  · Screening for depression     V79.0/Z13.89  · Need for tetanus booster     V03.7/Z23  · Screening for lipid disorders     V77.91/Z13.220  · Screening for colon " cancer     V76.51/Z12.11  · Post menopausal syndrome     V49.81/Z78.0  · Visit for screening mammogram     V76.12/Z12.31  · Hormone imbalance     259.9/E34.9  will check hormone levels  · Establishing care with new doctor, encounter for     V65.8/Z76.89  · Routine lab draw     V72.60/Z01.89    Problems Reconciled  Plan  · Orders  o Physical, Primary Care Panel (CBC, CMP, Lipid, TSH) [facility] ([id number], 90964, 68760, 11197) - V70.0/Z00.00 - 06/30/2020  o Female Fatigue Panel (CBC, CMP, TSH, B12) [facility] ([id number], 48806, 48210, 58533) - 780.79/R53.83 - 06/30/2020  o ACO-18: Positive screen for clinical depression using a standardized tool and a follow-up plan documented () - V79.0/Z13.89 - 06/30/2020  o Immunization Admin Fee (Single) ([facility]) (36373) - V03.7/Z23 - 06/30/2020  o TDaP Vaccine - Age 7+ (66359) - V03.7/Z23 - 06/30/2020   Vaccine - Tdap; Dose: 0.5; Site: Right Deltoid; Route: Intramuscular; Date: 06/30/2020 10:33:00; Exp: 06/22/2020; Lot: [id number]; Mfg: [employer]; TradeName: [employer]; Administered By: Sommer Clark LPN; Comment: pt alen well advised to wait 15min post inj.  o Lipid Panel [facility] (08899) - V77.91/Z13.220 - 06/30/2020  o COLONOSCOPY REFERRAL (COLON) - V76.51/Z12.11 - 06/30/2020   Dr. Linn-screening colonoscopy  o DEXA Bone Density, 1 or more sites, axial skeleton [facility] (68404) - V49.81/Z78.0 - 06/30/2020  o Screening Mammography; Bilateral 3D (13638, 60115, ) - V76.12/Z12.31 - 06/30/2020  o ACO-39: Current medications updated and reviewed () - - 06/30/2020  o ACO-18: Positive screen for clinical depression using a standardized tool and a follow-up plan documented () - - 06/30/2020  o ACO-14: Influenza immunization administered or previously received () - - 06/30/2020  o ACO-20: Screening Mammography documented and reviewed () - - 06/30/2020  o ACO - Advance Care Plan or Surrogate Decision Maker documented in EMR (1123F) - - 06/30/2020  o FSH (follicle stimulating hormone)  (34776) - 259.9/E34.9 - 06/30/2020  o LH (luteinizing hormone) (77979) - 259.9/E34.9 - 06/30/2020  o Progesterone Level (Serum) (50869) - 259.9/E34.9 - 06/30/2020  · Medications  o Medications have been Reconciled  o Transition of Care or Provider Policy  · Instructions  o Reviewed health maintenance flowsheet and updated information. Orders were placed and/or patient's response was documented.  o Depression Screen completed and scanned into the EMR under the designated folder within the patient's documents.  o Today's PHQ-9 result is _9_pt denies depression. States she is just tired due to lack of sleep.  o Discussed with patient the need for tetanus vaccination.   o Stop taking calcium supplements for at least 48 hours prior to your exam. Failure to stop supplements could alter results, and the radiologists will require you to reschedule your test.  o Patient was educated/instructed on their diagnosis, treatment and medications prior to discharge from the clinic today.  o Call the office with any concerns or questions.  o Flu vaccine declined.  · Disposition  o Follow Up PRN            Electronically Signed by: ARVIND Conn -Author on June 30, 2020 11:34:37 AM

## 2021-05-12 ENCOUNTER — OFFICE VISIT CONVERTED (OUTPATIENT)
Dept: FAMILY MEDICINE CLINIC | Facility: CLINIC | Age: 56
End: 2021-05-12
Attending: NURSE PRACTITIONER

## 2021-05-13 NOTE — PROGRESS NOTES
Progress Note      Patient Name: Carlota Hare   Patient ID: [id number]   Sex: Female   YOB: 1965    Primary Care Provider: Ruchi SAMUELS   Referring Provider: Ruchi SAMUELS    Visit Date: October 6, 2020    Provider: ARVIND Conn   Location: [facility] [place]   Location Address: [address] [place] [address], [address]  [place]  [id number]   Location Phone: [phone]          Chief Complaint  · extreme exhaustion  · Can't get a deep breathe  · Short of breathe, non exertion,   · tachycardia  · deep cough x 2 years  · carpal tunnel  · [place] horses  · runny bowels with abdominal pain      History Of Present Illness  Carlota Hare is a 55 year old /White female who presents for evaluation and treatment of:      Patient is here today to discuss extreme exhaustion. Patient states that this has been going on since May. Patient had labs done in June and they came back normal. Patient states she has no energy.   Patient feels like she can't get a deep breath. She states that her  does smoke in the house and that she does inhale second hand smoke, she has not had a chest xray done but is requesting one. Patient was seen for the shortness of breath before and they prescribed her an inhaler but it didn't work. Patient states that she gets out of breath with exertion, often but she can also be sitting on the couch not moving and get out of breath as well and feels like her heart is racing. Patient states that she expects her heart to race with exertion but not with non exertion. 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 She states she did see cardiology in the past maybe 10 years ago.   Pt admits to 2 caffeinated beverages daily. She does report bilateral lower edema, which is nothing new.     Patient states that she is also having pain in her wrist, thinking that she has carpal tunnel. She  does type a lot with her job. Patient states that this has been going on for about 3-4 months and it is getting progressivley worse very quickly. It is in both writsts, but the rigth is worse than the left.     Patient also states that she is getting erica horses on the tops of her feet. Bilaterly. This has been going on and off for 2-3 years. Patient eats lots of bananas and nuts.     Patient has extremly soft stools, going 2-4 times a day. Patient has runny stools, and 1-2 times a day she will have severe abdominal pain before she has to have a bowel movement and then when she has the bowel movement she feels better and the pain goes away. This had been going on for 3-4 months. This is occurring after she eats approx. 20-30 mins after every meal. She does have history of cholecystectomy, but she has nad normal BMs since then. This is really affecting her lifestyle as she is having to monitor when she goes to restaurants.       Past Medical History  Disease Name Date Onset Notes   Acute cystitis 01/04/2016 --    Aftercare following surgery of the musculoskeletal system, left knee arthroscopy 11/15/2018 --    Aftercare following surgery of the musculoskeletal system-- left shoulder rotator cuff repair 04/24/2018 --    Aftercare following surgery of the musculoskeletal system-- status post left shoulder arthroscopy 03/23/2018 --    Anemia, Unspecified --  --    Ankle fracture, left 12/19/2017 --    Bladder Disorder --  --    Depression --  --    Healing Fracture--Left ankle- distal fibula 11/22/2017 --    Hypertension --  --    Hypoglycemia --  --    Kidney stone 02/19/2018 --    Left ankle pain, unspecified chronicity 11/22/2017 --    Left knee pain, unspecified chronicity 11/15/2018 --    Left shoulder pain, unspecified chronicity 03/23/2018 --    Limb Swelling --  --    Medial meniscus tear, left knee 02/20/2018 --    Mixed Incontinence 03/06/2016 --    Nocturia 01/04/2016 --    Overactive bladder 10/19/2016 --     Pain, Low Back --  --    Pain: Knee 02/20/2018 --    Pain: Shoulder 02/20/2018 --    Rectal bleeding --  --    Resolved Hematuria 11/06/2015 --    Sciatica --  --    Seasonal allergies --  --    Shoulder: Rotator cuff tear, left 02/20/2018 --    Sleep apnea, unspecified --  --    Sprain: Ankle--left 11/22/2017 --    Swelling, left leg 01/12/2018 --    Swelling--left leg 01/11/2018 --    Urgency of micturition 01/04/2016 --    Urinary frequency 10/30/2015 --    Urinary Incontinence 03/06/2016 --          Past Surgical History  Procedure Name Date Notes   *Other --  Cystic fibrosis surgery on neck as a baby   Cesarian Section 10- --    Cholecystectomy --  --    Colonoscopy --  --    Cysto with Bladder Botox --  --    Cystoscopy --  with bladder botox   Gallbladder --  --    Hemorrhoidectomy 8-2010 --    Joint Surgery --  --    Repair of right rotator cuff --  --    Uterine ablation --  --          Medication List  Name Date Started Instructions   Daily Multiple  mg-mcg oral tablet  take 1 tablet by oral route once daily with food         Allergy List  Allergen Name Date Reaction Notes   Codeine Phosphate --  tired/super hyper --    Codeine Sulfate --  --  --    Demerol --  --  --    Lortab --  --  --    Percocet --  --  --    tramadol --  --  --    Tylenol --  --  ashwini /headache   Tylenol-Codeine #3 --  --  --        Allergies Reconciled  Family Medical History  Disease Name Relative/Age Notes   Breast Neoplasm, Malignant  --    Lung Neoplasm, Malignant  --    Heart Disease Father/   Father   Hyperlipidemia  --    Cancer, Unspecified Brother/  Father/  Sister/   Father; Sister; Brother  Mother   Diabetes, unspecified type Father/  Mother/   Mother; Father  Mother; Father  Mother; Father  Mother; Grandmother (maternal); Aunt (maternal); Grandmother (paternal); Aunt (paternal)   Hypertension  --    - No Family History of Colorectal Cancer  --    Family history of breast cancer  Aunt/40s   Blood  Diseases Brother/  Son/   Brother; Son   Family history of certain chronic disabling diseases; arthritis Mother/   Mother   Osteoporosis Daughter/  Mother/  Son/   Mother; Daughter; Son  Mother   Family history of Arthritis Brother/  Mother/  Sister/   Mother; Sister; Brother         Reproductive History  Menstrual   Pregnancy Summary   Total Pregnancies: 1 Full Term: 1 Premature: 0   Ab Induced: 0 Ab Spontaneous: 0 Ectopics: 0   Multiples: 0 Livin         Social History  Finding Status Start/Stop Quantity Notes   Alcohol Never 0/0 --  drinks no   Alcohol Use Never --/-- --  does not drink   Caffeine Current some day 0/0 --  drinks occasionally; coffee and soft drinks; 1-2 times per day   Claustophobic Unknown --/-- --  yes   lives with children --  --/-- --  --    lives with spouse --  --/-- --  --    . --  --/-- --  --    Recreational Drug Use Never --/-- --  no   Second hand smoke exposure Current some day 00 --  yes   Tobacco Never --/-- --  never smoker  never smoker  never smoker  never a smoker   Working --  --/-- --  --          Immunizations  NameDate Admin Mfg Trade Name Lot Number Route Inj VIS Given VIS Publication   Tdap2020 SKB BOOSTRIX [id number] IM RD 2020    Comments: pt alen well advised to wait 15min post inj.         Review of Systems  · Constitutional  o Denies  o : fatigue  · Eyes  o Denies  o : blurred vision, changes in vision  · HENT  o Denies  o : headaches  · Cardiovascular  o Admits  o : irregular heart beats, rapid heart rate  o Denies  o : chest pain, dyspnea on exertion  · Respiratory  o Denies  o : shortness of breath, wheezing, cough  · Gastrointestinal  o Admits  o : diarrhea, abdominal pain  o Denies  o : nausea, vomiting, constipation, blood in stools, melena  · Genitourinary  o Denies  o : frequency, dysuria, hematuria  · Integument  o Denies  o : rash, new skin lesions  · Musculoskeletal  o Admits  o : wrist pain  o Denies  o : joint pain, joint swelling,  "muscle pain  · Endocrine  o Denies  o : polyuria, polydipsia      Vitals  Date Time BP Position Site L\R Cuff Size HR RR TEMP (F) WT  HT  BMI kg/m2 BSA m2 O2 Sat FR L/min FiO2 HC       10/06/2020 08:52 /82 Sitting    87 - R  98.1 259lbs 2oz 5'  6\" 41.82 2.34 97 %  21%          Physical Examination  · Constitutional  o Appearance  o : well developed, well-nourished, no acute distress  · Head and Face  o Head  o : normocephalic, atraumatic  · Neck  o Inspection/Palpation  o : normal appearance, no masses or tenderness, trachea midline  o Thyroid  o : gland size normal, nontender, no nodules or masses present on palpation  · Respiratory  o Respiratory Effort  o : breathing unlabored  o Inspection of Chest  o : chest rise symmetric bilaterally  o Auscultation of Lungs  o : clear to auscultation bilaterally throughout inspiration and expiration  · Cardiovascular  o Heart  o :   § Auscultation of Heart  § : regular rate and rhythm, no murmurs, gallops or rubs  o Peripheral Vascular System  o :   § Extremities  § : no edema  · Gastrointestinal  o Abdominal Examination  o : abdomen nontender to palpation, tone normal without rigidity or guarding, no masses present, abdominal contour scaphoid  o Liver and spleen  o : no hepatomegaly present, liver nontender to palpation, spleen not palpable  · Lymphatic  o Neck  o : no cervical lymphadenopathy, no supraclavicular lymphadenopathy  · Psychiatric  o Mood and Affect  o : mood normal, affect appropriate          Assessment  · Abdominal pain     789.00/R10.9  · Diarrhea     787.91/R19.7  · Fatigue     780.79/R53.83  · Irregular heart rate     427.9/I49.9  · Shortness of breath     786.05/R06.02  · Bilateral wrist pain       Pain in right wrist     719.43/M25.531  Pain in left wrist     719.43/M25.532  · Palpitations     785.1/R00.2  · Dyspnea     786.09/R06.00  · Edema     782.3/R60.9  · Leg cramp     729.82/R25.2    Problems Reconciled  Plan  · Orders  o Amylase and lipase " panel (89152, 60377) - 789.00/R10.9 - 10/06/2020  o CBC with Auto Diff [facility] (29662) - 789.00/R10.9 - 10/06/2020  o CMP [facility] (36516) - 789.00/R10.9 - 10/06/2020  o Lactoferrin (Fecal) Qualitative (13004) - 789.00/R10.9 - 10/06/2020  o Stool culture (01021, 92797) - 789.00/R10.9 - 10/06/2020  o ACO-39: Current medications updated and reviewed (1159F, ) - - 10/06/2020  o ACO-20: Screening Mammography documented and reviewed [facility] () - - 10/06/2020  o ACO-19: Colorectal cancer screening results documented and reviewed (3017F) - - 10/06/2020  o Chest xray 2 views [facility] Preferred View (02767) - 786.05/R06.02 - 10/06/2020  o EMG/NCV of Upper Extremities Bilaterally (74774) - 719.43/M25.531, 719.43/M25.532 - 10/06/2020  o EKG (Recording only) [facility] (33679) - 427.9/I49.9, 785.1/R00.2 - 10/06/2020  o Vitamin D Level (54501) - 785.1/R00.2, 786.09/R06.00, 782.3/R60.9, 780.79/R53.83 - 10/06/2020  o BNP (brain natriuretic peptide measurement) (84187) - 427.9/I49.9, 786.05/R06.02, 786.09/R06.00, 782.3/R60.9 - 10/06/2020  o PFT Pre and Post Bronchodilator [facility] (33508) - 786.05/R06.02, 785.1/R00.2, 786.09/R06.00 - 10/06/2020  o Magnesium, serum (37638) - 786.09/R06.00, 782.3/R60.9, 780.79/R53.83, 729.82/R25.2 - 10/06/2020  o Echocardiogram - Complete [facility] (51911, 65572, 91016) - 427.9/I49.9, 786.05/R06.02, 785.1/R00.2 - 10/06/2020  o Holter Monitor 24 Hour [facility] (95647) - 427.9/I49.9, 785.1/R00.2, 786.09/R06.00 - 10/06/2020  o Clostridium Difficile Toxigenic Assay (PCR) [facility] (19965) - 789.00/R10.9, 787.91/R19.7 - 10/06/2020  · Medications  o Medications have been Reconciled  o Transition of Care or Provider Policy  · Instructions  o Instructed to seek medical attention urgently for new or worsening symptoms.  o Patient was educated/instructed on their diagnosis, treatment and medications prior to discharge from the clinic today.  o Patient instructed to seek medical attention urgently for new or worsening symptoms.  o Call the office  with any concerns or questions.  o Electronically Identified Patient Education Materials Provided Electronically  · Disposition  o Follow Up in 1 month            Electronically Signed by: ARVIND Conn -Author on October 7, 2020 07:27:49 AM

## 2021-05-13 NOTE — PROGRESS NOTES
Progress Note      Patient Name: Carlota Hare   Patient ID: [id number]   Sex: Female   YOB: 1965    Primary Care Provider: Harsha Prabhakar DO   Referring Provider: Ruchi SAMUELS    Visit Date: November 3, 2020    Provider: ARVIND Conn   Location: [facility] [place]   Location Address: [address] [place] [address], [address]  [place], [place]  [id number]   Location Phone: [phone]          Chief Complaint  · Follow up on Fatigue and SOA      History Of Present Illness  Carlota Hare is a 55 year old /White female who presents for evaluation and treatment of:      Patient is here today to follow up on fatigue and shortness of air. Patient states that she doesn't have any progress to report. She states that she feels the same and doesn't have any improvevments. Patient states that she has not gotten worse though. She is scheduled for spirometry in December at the hospital.     hand numbess-She did have the nerve conduction study done per dr mccarthy and was told she did not have carpal tunnel. She states she had a bad experience there. She is stilll experiencing the pain and numbness in the right hand and radiating up the right arm.     She is still c/o diarrhea-worse after meals. She knows this is due to having her gallbladder out. All stool studies we did at previous OV were negative.       Past Medical History  Disease Name Date Onset Notes   Acute cystitis 01/04/2016 --    Aftercare following surgery of the musculoskeletal system, left knee arthroscopy 11/15/2018 --    Aftercare following surgery of the musculoskeletal system-- left shoulder rotator cuff repair 04/24/2018 --    Aftercare following surgery of the musculoskeletal system-- status post left shoulder arthroscopy 03/23/2018 --    Anemia, Unspecified --  --    Ankle fracture, left 12/19/2017 --    Bladder Disorder --  --    Depression --  --    Healing Fracture--Left ankle- distal fibula 11/22/2017 --    Hypertension  --  --    Hypoglycemia --  --    Kidney stone 02/19/2018 --    Left ankle pain, unspecified chronicity 11/22/2017 --    Left knee pain, unspecified chronicity 11/15/2018 --    Left shoulder pain, unspecified chronicity 03/23/2018 --    Limb Swelling --  --    Medial meniscus tear, left knee 02/20/2018 --    Mixed Incontinence 03/06/2016 --    Nocturia 01/04/2016 --    Overactive bladder 10/19/2016 --    Pain, Low Back --  --    Pain: Knee 02/20/2018 --    Pain: Shoulder 02/20/2018 --    Rectal bleeding --  --    Resolved Hematuria 11/06/2015 --    Sciatica --  --    Seasonal allergies --  --    Shoulder: Rotator cuff tear, left 02/20/2018 --    Sleep apnea, unspecified --  --    Sprain: Ankle--left 11/22/2017 --    Swelling, left leg 01/12/2018 --    Swelling--left leg 01/11/2018 --    Urgency of micturition 01/04/2016 --    Urinary frequency 10/30/2015 --    Urinary incontinence 03/06/2016 --          Past Surgical History  Procedure Name Date Notes   *Other --  Cystic fibrosis surgery on neck as a baby   Cesarian Section 10- --    Cholecystectomy --  --    Colonoscopy --  --    Cysto with Bladder Botox --  --    Cystoscopy --  with bladder botox   Gallbladder --  --    Hemorrhoidectomy 8-2010 --    Joint Surgery --  --    Repair of right rotator cuff --  --    Uterine ablation --  --          Medication List  Name Date Started Instructions   Daily Multiple  mg-mcg oral tablet  take 1 tablet by oral route once daily with food   Vitamin D2 1,250 mcg (50,000 unit) oral capsule 10/13/2020 take 1 capsule by oral route once weekly for 12 weeks         Allergy List  Allergen Name Date Reaction Notes   Codeine Phosphate --  tired/super hyper --    Codeine Sulfate --  --  --    Demerol --  --  --    Lortab --  --  --    Percocet --  --  --    tramadol --  --  --    Tylenol --  --  ashwini /headache   Tylenol-Codeine #3 --  --  --        Allergies Reconciled  Family Medical History  Disease Name Relative/Age Notes    Breast Neoplasm, Malignant  --    Lung Neoplasm, Malignant  --    Heart Disease Father/   Father   Hyperlipidemia  --    Cancer, Unspecified Brother/  Father/  Sister/   Father; Sister; Brother  Mother   Diabetes, unspecified type Father/  Mother/   Mother; Father  Mother; Father  Mother; Father  Mother; Grandmother (maternal); Aunt (maternal); Grandmother (paternal); Aunt (paternal)   Hypertension  --    - No Family History of Colorectal Cancer  --    Family history of breast cancer  Aunt/40s   Blood Diseases Brother/  Son/   Brother; Son   Family history of certain chronic disabling diseases; arthritis Mother/   Mother   Osteoporosis Daughter/  Mother/  Son/   Mother; Daughter; Son  Mother   Family history of Arthritis Brother/  Mother/  Sister/   Mother; Sister; Brother         Reproductive History  Menstrual   Pregnancy Summary   Total Pregnancies: 1 Full Term: 1 Premature: 0   Ab Induced: 0 Ab Spontaneous: 0 Ectopics: 0   Multiples: 0 Livin         Social History  Finding Status Start/Stop Quantity Notes   Alcohol Never 0/0 --  drinks no   Alcohol Use Never --/-- --  does not drink   Caffeine Current some day 0/0 --  drinks occasionally; coffee and soft drinks; 1-2 times per day   Claustophobic Unknown --/-- --  yes   lives with children --  --/-- --  --    lives with spouse --  --/-- --  --    . --  --/-- --  --    Recreational Drug Use Never --/-- --  no   Second hand smoke exposure Current some day 0/0 --  yes   Tobacco Never --/-- --  never smoker  never smoker  never smoker  never a smoker   Working --  --/-- --  --          Immunizations  NameDate Admin Mfg Trade Name Lot Number Route Inj VIS Given VIS Publication   Tdap2020 SKB BOOSTRIX [id number] IM RD 2020    Comments: pt alen well advised to wait 15min post inj.         Review of Systems  · Constitutional  o Denies  o : fatigue  · Eyes  o Denies  o : blurred vision, changes in vision  · HENT  o Denies  o :  "headaches  · Cardiovascular  o Denies  o : chest pain, irregular heart beats, rapid heart rate, dyspnea on exertion  · Respiratory  o Denies  o : shortness of breath, wheezing, cough  · Gastrointestinal  o Admits  o : diarrhea  o Denies  o : nausea, vomiting, constipation, abdominal pain, blood in stools, melena  · Genitourinary  o Denies  o : frequency, dysuria, hematuria  · Integument  o Denies  o : rash, new skin lesions  · Musculoskeletal  o Admits  o : right hand pain  o Denies  o : joint pain, joint swelling, muscle pain  · Endocrine  o Denies  o : polyuria, polydipsia      Vitals  Date Time BP Position Site L\R Cuff Size HR RR TEMP (F) WT  HT  BMI kg/m2 BSA m2 O2 Sat FR L/min FiO2 HC       11/03/2020 08:53 /85 Sitting    78 - R  98.5 259lbs 2oz 5'  6\" 41.82 2.34 97 %  21%          Physical Examination  · Constitutional  o Appearance  o : well developed, well-nourished, no acute distress  · Head and Face  o Head  o : normocephalic, atraumatic  · Neck  o Inspection/Palpation  o : normal appearance, no masses or tenderness, trachea midline  o Thyroid  o : gland size normal, nontender, no nodules or masses present on palpation  · Respiratory  o Respiratory Effort  o : breathing unlabored  o Inspection of Chest  o : chest rise symmetric bilaterally  o Auscultation of Lungs  o : clear to auscultation bilaterally throughout inspiration and expiration  · Cardiovascular  o Heart  o :   § Auscultation of Heart  § : regular rate and rhythm, no murmurs, gallops or rubs  o Peripheral Vascular System  o :   § Extremities  § : no edema  · Lymphatic  o Neck  o : no cervical lymphadenopathy, no supraclavicular lymphadenopathy  · Psychiatric  o Mood and Affect  o : mood normal, affect appropriate          Assessment  · Diarrhea     787.91/R19.7  trial of cholestyramine powder, side effects and administration addressed.  · Right hand pain     729.5/M79.641  will refer to [facility]    Problems " Reconciled  Plan  · Orders  o ACO-39: Current medications updated and reviewed (1159F, ) - - 11/03/2020  o Hand Surgeon/Consultation (HANDS) - 729.5/M79.641 - 11/03/2020   [facility]  · Medications  o Cholestyramine Light 4 gram oral powder   SIG: mix on 4g scoop with 6oz beverage once daily   DISP: (1) Can with 5 refills  Prescribed on 11/03/2020     o Medications have been Reconciled  o Transition of Care or Provider Policy  · Instructions  o Take all medications as prescribed/directed.  o Patient was educated/instructed on their diagnosis, treatment and medications prior to discharge from the clinic today.  o Call the office with any concerns or questions.  o Chronic conditions reviewed and taken into consideration for today's treatment plan.  o Discussed Covid-19 precautions including, but not limited to, social distancing, avoid touching your face, and hand washing.   o Electronically Identified Patient Education Materials Provided Electronically  · Disposition  o Follow Up in 3 months            Electronically Signed by: ARVIND Conn -Author on November 3, 2020 10:01:30 AM

## 2021-05-14 VITALS
WEIGHT: 259.12 LBS | HEIGHT: 66 IN | BODY MASS INDEX: 41.64 KG/M2 | TEMPERATURE: 98.1 F | HEART RATE: 87 BPM | OXYGEN SATURATION: 97 % | DIASTOLIC BLOOD PRESSURE: 82 MMHG | SYSTOLIC BLOOD PRESSURE: 152 MMHG

## 2021-05-14 VITALS
OXYGEN SATURATION: 97 % | DIASTOLIC BLOOD PRESSURE: 85 MMHG | HEART RATE: 78 BPM | WEIGHT: 259.12 LBS | BODY MASS INDEX: 41.64 KG/M2 | TEMPERATURE: 98.5 F | HEIGHT: 66 IN | SYSTOLIC BLOOD PRESSURE: 149 MMHG

## 2021-05-15 VITALS
WEIGHT: 263 LBS | BODY MASS INDEX: 42.27 KG/M2 | OXYGEN SATURATION: 100 % | DIASTOLIC BLOOD PRESSURE: 83 MMHG | HEART RATE: 75 BPM | HEIGHT: 66 IN | SYSTOLIC BLOOD PRESSURE: 143 MMHG | TEMPERATURE: 98.4 F

## 2021-05-15 VITALS — OXYGEN SATURATION: 98 % | BODY MASS INDEX: 39.77 KG/M2 | HEART RATE: 96 BPM | HEIGHT: 66 IN

## 2021-05-15 VITALS — BODY MASS INDEX: 39.77 KG/M2 | HEIGHT: 66 IN | OXYGEN SATURATION: 98 % | HEART RATE: 88 BPM

## 2021-05-16 VITALS — HEIGHT: 66 IN | WEIGHT: 223 LBS | BODY MASS INDEX: 35.84 KG/M2 | HEART RATE: 90 BPM | OXYGEN SATURATION: 98 %

## 2021-05-16 VITALS — BODY MASS INDEX: 36.16 KG/M2 | HEART RATE: 80 BPM | OXYGEN SATURATION: 98 % | WEIGHT: 225 LBS | HEIGHT: 66 IN

## 2021-05-16 VITALS — OXYGEN SATURATION: 97 % | HEART RATE: 110 BPM | BODY MASS INDEX: 39.76 KG/M2 | HEIGHT: 66 IN | WEIGHT: 247.37 LBS

## 2021-05-16 VITALS — WEIGHT: 255 LBS | OXYGEN SATURATION: 97 % | HEIGHT: 66 IN | BODY MASS INDEX: 40.98 KG/M2

## 2021-05-16 VITALS — HEIGHT: 66 IN | HEART RATE: 75 BPM | BODY MASS INDEX: 39.23 KG/M2 | WEIGHT: 244.12 LBS | OXYGEN SATURATION: 97 %

## 2021-05-16 VITALS — HEART RATE: 81 BPM | HEIGHT: 66 IN | BODY MASS INDEX: 41.34 KG/M2 | OXYGEN SATURATION: 98 % | WEIGHT: 257.25 LBS

## 2021-05-16 VITALS — HEIGHT: 66 IN | HEART RATE: 101 BPM | OXYGEN SATURATION: 97 % | BODY MASS INDEX: 41.71 KG/M2 | WEIGHT: 259.5 LBS

## 2021-05-16 VITALS — BODY MASS INDEX: 39.77 KG/M2 | HEIGHT: 66 IN | HEART RATE: 98 BPM | OXYGEN SATURATION: 98 %

## 2021-05-19 ENCOUNTER — HOSPITAL ENCOUNTER (OUTPATIENT)
Dept: GENERAL RADIOLOGY | Facility: HOSPITAL | Age: 56
Discharge: HOME | End: 2021-05-19
Attending: NURSE PRACTITIONER

## 2021-06-05 NOTE — PROGRESS NOTES
Progress Note      Patient Name: Carlota Hare   Patient ID: [id number]   Sex: Female   YOB: 1965    Primary Care Provider: Ruchi SAMUELS   Referring Provider: Ruchi SAMUELS    Visit Date: May 12, 2021    Provider: ARVIND Conn   Location: [facility]   Location Address: [address] [place] [address], [address]  [place], [place]  [id number]   Location Phone: [phone]          Chief Complaint  · Right breast pain  · left breast nipple changes      History Of Present Illness  Carlota Hare is a 55 year old /White female who presents for evaluation and treatment of:      Patient complaining of right breast pain.  Patient denies lumps, bumps.  Patient very rarely does a self breast exams at home. This  pain has been going on for about 6 months. Pt states she feels stabbing pain that feels like it pierces her nipple and goes straight through her breast. pt denies any nipple discharge. She states this pain has actually woken her up from sleep a few times.     Patient also notes that her left nipple used to be inverted but approx. 6-7 months ago it started extending out instead of in. denies any nipple discharge. pt states she has not been having any pain in the left breast.     Patient has family history of breast cancer in maternal aunt.  Patient has another maternal aunt with history of ovarian cancer.    Last pap smear 2 years ago.       Past Medical History  Disease Name Date Onset Notes   Acute cystitis 01/04/2016 --    Aftercare following surgery of the musculoskeletal system, left knee arthroscopy 11/15/2018 --    Aftercare following surgery of the musculoskeletal system-- left shoulder rotator cuff repair 04/24/2018 --    Aftercare following surgery of the musculoskeletal system-- status post left shoulder arthroscopy 03/23/2018 --    Anemia, Unspecified --  --    Ankle fracture, left 12/19/2017 --    Bladder disorder --  --    Depression --  --    Healing  Fracture--Left ankle- distal fibula 11/22/2017 --    Hypertension --  --    Hypoglycemia --  --    Kidney stone 02/19/2018 --    Left ankle pain, unspecified chronicity 11/22/2017 --    Left knee pain, unspecified chronicity 11/15/2018 --    Left shoulder pain, unspecified chronicity 03/23/2018 --    Limb Swelling --  --    Medial meniscus tear, left knee 02/20/2018 --    Mixed Incontinence 03/06/2016 --    Nocturia 01/04/2016 --    Overactive bladder 10/19/2016 --    Pain, Low Back --  --    Pain: Knee 02/20/2018 --    Pain: Shoulder 02/20/2018 --    Rectal bleeding --  --    Resolved Hematuria 11/06/2015 --    Sciatica --  --    Seasonal allergies --  --    Shoulder: Rotator cuff tear, left 02/20/2018 --    Sleep apnea, unspecified --  --    Sprain: Ankle--left 11/22/2017 --    Swelling, left leg 01/12/2018 --    Swelling--left leg 01/11/2018 --    Urgency of micturition 01/04/2016 --    Urinary frequency 10/30/2015 --    Urinary Incontinence 03/06/2016 --          Past Surgical History  Procedure Name Date Notes   *Other --  Cystic fibrosis surgery on neck as a baby   Cesarian Section 10- --    Cholecystectomy --  --    Colonoscopy --  --    Cysto with Bladder Botox --  --    Cystoscopy --  with bladder botox   Gallbladder --  --    Hemorrhoidectomy 8-2010 --    Joint Surgery --  --    Repair of right rotator cuff --  --    Uterine ablation --  --          Medication List  Name Date Started Instructions   Fish Oil 1,000 mg (120 mg-180 mg) oral capsule  take 1 capsule by oral route daily   vitamin B12-folic acid 500-400 mcg oral tablet  take 1 tablet by oral route daily   Vitamin C 250 mg oral tablet,chewable  chew 1 tablet by oral route daily   Vitamin D2 1,250 mcg (50,000 unit) oral capsule 10/13/2020 take 1 capsule by oral route once weekly for 12 weeks   vitamin E 1,000 unit oral capsule  take 1 capsule by oral route daily         Allergy List  Allergen Name Date Reaction Notes   Codeine Phosphate --   tired/super hyper --    Codeine Sulfate --  --  --    Demerol --  --  --    Lortab --  --  --    Percocet --  --  --    tramadol --  --  --    Tylenol --  --  ashwini /headache   Tylenol-Codeine #3 --  --  --        Allergies Reconciled  Family Medical History  Disease Name Relative/Age Notes   Breast Neoplasm, Malignant  --    Lung Neoplasm, Malignant  --    Heart Disease Father/   Father   Hyperlipidemia  --    Cancer, Unspecified Brother/  Father/  Sister/   Father; Sister; Brother  Mother   Diabetes, unspecified type Father/  Mother/   Mother; Father  Mother; Father  Mother; Father  Mother; Grandmother (maternal); Aunt (maternal); Grandmother (paternal); Aunt (paternal)   Hypertension  --    - No Family History of Colorectal Cancer  --    Family history of breast cancer  Aunt/40s   Blood Diseases Brother/  Son/   Brother; Son   Family history of certain chronic disabling diseases; arthritis Mother/   Mother   Osteoporosis Daughter/  Mother/  Son/   Mother; Daughter; Son  Mother   Family history of Arthritis Brother/  Mother/  Sister/   Mother; Sister; Brother         Reproductive History  Menstrual   Pregnancy Summary   Total Pregnancies: 1 Full Term: 1 Premature: 0   Ab Induced: 0 Ab Spontaneous: 0 Ectopics: 0   Multiples: 0 Livin         Social History  Finding Status Start/Stop Quantity Notes   Alcohol Never 0/0 --  drinks no   Alcohol Use Never --/-- --  does not drink   Caffeine Current some day 0/0 --  drinks occasionally; coffee and soft drinks; 1-2 times per day   Claustophobic Unknown --/-- --  yes   lives with children --  --/-- --  --    lives with spouse --  --/-- --  --    . --  --/-- --  --    Recreational Drug Use Never --/-- --  no   Second hand smoke exposure Current some day 0/0 --  yes   Tobacco Never --/-- --  --    Working --  --/-- --  --          Immunizations  NameDate Admin Mfg Trade Name Lot Number Route Inj VIS Given VIS Publication   Axmbvienf02/10/2020 [facility] Fluzone  "Quadrivalent  NE NE 05/12/2021    Comments:    Tdap06/30/2020 [facility] BOOSTRIX 374LB IM RD 06/30/2020    Comments: pt alen well advised to wait 15min post inj.         Review of Systems  · Constitutional  o Denies  o : fatigue  · Eyes  o Denies  o : blurred vision, changes in vision  · HENT  o Denies  o : headaches  · Cardiovascular  o Denies  o : chest pain, irregular heart beats, rapid heart rate, dyspnea on exertion  · Respiratory  o Denies  o : shortness of breath, wheezing, cough  · Gastrointestinal  o Denies  o : nausea, vomiting, diarrhea, constipation, abdominal pain, blood in stools, melena  · Genitourinary  o Denies  o : frequency, dysuria, hematuria  · Integument  o Denies  o : rash, new skin lesions  · Musculoskeletal  o Denies  o : joint pain, joint swelling, muscle pain  · Endocrine  o Denies  o : polyuria, polydipsia      Vitals  Date Time BP Position Site L\R Cuff Size HR RR TEMP (F) WT  HT  BMI kg/m2 BSA m2 O2 Sat FR L/min FiO2 HC       10/06/2020 08:52 /82 Sitting    87 - R  98.1 259lbs 2oz 5'  6\" 41.82 2.34 97 %  21%    11/03/2020 08:53 /85 Sitting    78 - R  98.5 259lbs 2oz 5'  6\" 41.82 2.34 97 %  21%    05/12/2021 07:42 /87 Sitting    80 - R  98.2 263lbs 6oz 5'  6\" 42.51 2.36 100 %  21%          Physical Examination  · Constitutional  o Appearance  o : well developed, well-nourished, no acute distress  · Head and Face  o Head  o : normocephalic, atraumatic  · Respiratory  o Respiratory Effort  o : breathing unlabored  o Inspection of Chest  o : chest rise symmetric bilaterally  o Auscultation of Lungs  o : clear to auscultation bilaterally throughout inspiration and expiration  · Cardiovascular  o Heart  o :   § Auscultation of Heart  § : regular rate and rhythm, no murmurs, gallops or rubs  o Peripheral Vascular System  o :   § Extremities  § : no edema  · Breasts  o Inspection of Breasts  o : developmental state normal for age, breasts symmetrical, no skin changes, no discharge " present, no deformities present  o Palpation of Breasts, Axillae  o : right breast TTP, nodule noted at 11 oclock area, TTP noted in right axillae area- left breast TTP, no palpable nodules, severe pain noted at 7oclock area  · Psychiatric  o Mood and Affect  o : mood normal, affect appropriate          Assessment  · Breast pain, right     611.71/N64.4  · Abnormal breast exam     611.79/N64.59  · Breast pain, left     611.71/N64.4  · Disorder of nipple of breast     611.9/N64.9  previously inverted nipple of left breast, now normal nipple      Plan  · Orders  o ACO-14: Influenza immunization administered or previously received [facility] () - - 05/12/2021  o ACO-39: Current medications updated and reviewed (, 1159F) - - 05/12/2021  o Diagnostic Mammogram 3D breast bilateral (w/US if needed) [facility] (, ) - 611.71/N64.4, 611.79/N64.59, 611.9/N64.9 - 05/12/2021  · Medications  o Medications have been Reconciled  o Transition of Care or Provider Policy  · Instructions  o Patient was educated/instructed on their diagnosis, treatment and medications prior to discharge from the clinic today.  o Patient instructed to seek medical attention urgently for new or worsening symptoms.  o Call the office with any concerns or questions.  o Electronically Identified Patient Education Materials Provided Electronically  · Disposition  o Call or Return if symptoms worsen or persist.  o Follow Up PRN            Electronically Signed by: ARVIND Conn -Author on May 12, 2021 08:48:16 AM

## 2021-07-15 VITALS
TEMPERATURE: 98.2 F | WEIGHT: 263.37 LBS | BODY MASS INDEX: 42.33 KG/M2 | HEIGHT: 66 IN | HEART RATE: 80 BPM | OXYGEN SATURATION: 100 % | SYSTOLIC BLOOD PRESSURE: 146 MMHG | DIASTOLIC BLOOD PRESSURE: 87 MMHG

## 2021-08-06 ENCOUNTER — TELEPHONE (OUTPATIENT)
Dept: FAMILY MEDICINE CLINIC | Facility: CLINIC | Age: 56
End: 2021-08-06

## 2021-08-06 NOTE — TELEPHONE ENCOUNTER
Caller: Payam Carlota YECENIA    Relationship: Self    Best call back number: [phone]     What medication are you requesting: SOMETHING TO TREAT CURRENT SYMPTOMS. PATIENT STATES SHE WAS RECENTLY DIAGNOSED WITH COVID    What are your current symptoms: FATIGUE, NAUSEA, COUGH    How long have you been experiencing symptoms: A FEW DAYS    Have you had these symptoms before:    [] Yes  [x] No    Have you been treated for these symptoms before:   [] Yes  [x] No    If a prescription is needed, what is your preferred pharmacy and phone number: [facility] [place] [address] - [phone]  - [phone]      Additional notes: PATIENT STATES SHE WOULD LIKE A CALL BACK ABOUT THIS

## 2021-08-10 ENCOUNTER — TELEPHONE (OUTPATIENT)
Dept: FAMILY MEDICINE CLINIC | Facility: CLINIC | Age: 56
End: 2021-08-10

## 2021-08-10 NOTE — TELEPHONE ENCOUNTER
Caller: Carlota Hare    Relationship: Self    Best call back number: [phone]  What is the best time to reach you: ANYTIME    Who are you requesting to speak with (clinical staff, provider,  specific staff member): ARVIND BRAUN       What was the call regarding:  PATIENT WAS DIAGNOSED WITH COVID ON Tuesday OF LAST WEEK.  SHE IS STILL VERY WEAK AND HAVING VERY HARD TIME BREATHING.  WOULD LIKE TO DISCUSS IF THERE IS ANYTHING TO HELP WITH SYMPTOMS    Do you require a callback: YES

## 2021-08-10 NOTE — TELEPHONE ENCOUNTER
Pt can take OTC symptomatic treatment to help with her symptoms. Is she wanting me to send in medication to help with her cough?

## 2021-08-11 ENCOUNTER — TELEPHONE (OUTPATIENT)
Dept: FAMILY MEDICINE CLINIC | Facility: CLINIC | Age: 56
End: 2021-08-11

## 2021-08-11 RX ORDER — DEXTROMETHORPHAN HYDROBROMIDE AND PROMETHAZINE HYDROCHLORIDE 15; 6.25 MG/5ML; MG/5ML
5 SYRUP ORAL 2 TIMES DAILY
Qty: 118 ML | Refills: 0 | Status: SHIPPED | OUTPATIENT
Start: 2021-08-11 | End: 2022-05-09

## 2021-08-11 NOTE — TELEPHONE ENCOUNTER
Patient called stating she tested positive for Covid 19 on Tuesday.  She continues to have cough, weakness, HA, nausea, fever.  Taking Motrin with short term relief of fever.  Patient requesting work note and something sent to [facility] for cough.

## 2021-08-13 ENCOUNTER — APPOINTMENT (OUTPATIENT)
Dept: GENERAL RADIOLOGY | Facility: HOSPITAL | Age: 56
End: 2021-08-13

## 2021-08-13 ENCOUNTER — HOSPITAL ENCOUNTER (INPATIENT)
Facility: HOSPITAL | Age: 56
LOS: 3 days | Discharge: HOME | End: 2021-08-16
Attending: EMERGENCY MEDICINE | Admitting: INTERNAL MEDICINE

## 2021-08-13 DIAGNOSIS — J18.9 PNEUMONIA OF RIGHT UPPER LOBE DUE TO INFECTIOUS ORGANISM: Primary | ICD-10-CM

## 2021-08-13 PROBLEM — U07.1 COVID-19 VIRUS INFECTION: Status: ACTIVE | Noted: 2021-08-13

## 2021-08-13 PROBLEM — E66.01 MORBID OBESITY: Status: ACTIVE | Noted: 2021-08-13

## 2021-08-13 PROBLEM — I95.9 HYPOTENSION: Status: ACTIVE | Noted: 2021-08-13

## 2021-08-13 PROBLEM — G47.33 OSA (OBSTRUCTIVE SLEEP APNEA): Status: ACTIVE | Noted: 2021-08-13

## 2021-08-13 PROBLEM — J96.01 ACUTE RESPIRATORY FAILURE WITH HYPOXIA (HCC): Status: ACTIVE | Noted: 2021-08-13

## 2021-08-13 LAB
ALBUMIN SERPL-MCNC: 3.8 G/DL (ref 3.5–5.2)
ALBUMIN/GLOB SERPL: 1.2 G/DL
ALP SERPL-CCNC: 120 U/L (ref 39–117)
ALT SERPL W P-5'-P-CCNC: 40 U/L (ref 1–33)
ANION GAP SERPL CALCULATED.3IONS-SCNC: 13.7 MMOL/L (ref 5–15)
AST SERPL-CCNC: 32 U/L (ref 1–32)
BASOPHILS # BLD AUTO: 0.02 10*3/MM3 (ref 0–0.2)
BASOPHILS NFR BLD AUTO: 0.3 % (ref 0–1.5)
BILIRUB SERPL-MCNC: 0.6 MG/DL (ref 0–1.2)
BUN SERPL-MCNC: 22 MG/DL (ref 6–20)
BUN/CREAT SERPL: 22.9 (ref 7–25)
CALCIUM SPEC-SCNC: 8.8 MG/DL (ref 8.6–10.5)
CHLORIDE SERPL-SCNC: 103 MMOL/L (ref 98–107)
CO2 SERPL-SCNC: 22.3 MMOL/L (ref 22–29)
CREAT SERPL-MCNC: 0.96 MG/DL (ref 0.57–1)
D-LACTATE SERPL-SCNC: 1.7 MMOL/L (ref 0.5–2)
DEPRECATED RDW RBC AUTO: 41.6 FL (ref 37–54)
EOSINOPHIL # BLD AUTO: 0.01 10*3/MM3 (ref 0–0.4)
EOSINOPHIL NFR BLD AUTO: 0.2 % (ref 0.3–6.2)
ERYTHROCYTE [DISTWIDTH] IN BLOOD BY AUTOMATED COUNT: 13.4 % (ref 12.3–15.4)
GFR SERPL CREATININE-BSD FRML MDRD: 60 ML/MIN/1.73
GLOBULIN UR ELPH-MCNC: 3.1 GM/DL
GLUCOSE SERPL-MCNC: 95 MG/DL (ref 65–99)
HCT VFR BLD AUTO: 47.4 % (ref 34–46.6)
HGB BLD-MCNC: 15.5 G/DL (ref 12–15.9)
HOLD SPECIMEN: NORMAL
HOLD SPECIMEN: NORMAL
IMM GRANULOCYTES # BLD AUTO: 0.02 10*3/MM3 (ref 0–0.05)
IMM GRANULOCYTES NFR BLD AUTO: 0.3 % (ref 0–0.5)
LYMPHOCYTES # BLD AUTO: 1.45 10*3/MM3 (ref 0.7–3.1)
LYMPHOCYTES NFR BLD AUTO: 24.9 % (ref 19.6–45.3)
MCH RBC QN AUTO: 27.6 PG (ref 26.6–33)
MCHC RBC AUTO-ENTMCNC: 32.7 G/DL (ref 31.5–35.7)
MCV RBC AUTO: 84.5 FL (ref 79–97)
MONOCYTES # BLD AUTO: 0.34 10*3/MM3 (ref 0.1–0.9)
MONOCYTES NFR BLD AUTO: 5.8 % (ref 5–12)
NEUTROPHILS NFR BLD AUTO: 3.99 10*3/MM3 (ref 1.7–7)
NEUTROPHILS NFR BLD AUTO: 68.5 % (ref 42.7–76)
NRBC BLD AUTO-RTO: 0 /100 WBC (ref 0–0.2)
NT-PROBNP SERPL-MCNC: 6.6 PG/ML (ref 0–900)
PLATELET # BLD AUTO: 125 10*3/MM3 (ref 140–450)
PMV BLD AUTO: 11 FL (ref 6–12)
POTASSIUM SERPL-SCNC: 3.6 MMOL/L (ref 3.5–5.2)
PROT SERPL-MCNC: 6.9 G/DL (ref 6–8.5)
QT INTERVAL: 363 MS
RBC # BLD AUTO: 5.61 10*6/MM3 (ref 3.77–5.28)
RBC MORPH BLD: NORMAL
SMALL PLATELETS BLD QL SMEAR: ADEQUATE
SODIUM SERPL-SCNC: 139 MMOL/L (ref 136–145)
TROPONIN T SERPL-MCNC: <0.01 NG/ML (ref 0–0.03)
WBC # BLD AUTO: 5.83 10*3/MM3 (ref 3.4–10.8)
WBC MORPH BLD: NORMAL
WHOLE BLOOD HOLD SPECIMEN: NORMAL

## 2021-08-13 PROCEDURE — 99285 EMERGENCY DEPT VISIT HI MDM: CPT

## 2021-08-13 PROCEDURE — 25010000002 AZITHROMYCIN PER 500 MG: Performed by: EMERGENCY MEDICINE

## 2021-08-13 PROCEDURE — 93005 ELECTROCARDIOGRAM TRACING: CPT | Performed by: EMERGENCY MEDICINE

## 2021-08-13 PROCEDURE — 25010000002 CEFTRIAXONE PER 250 MG: Performed by: EMERGENCY MEDICINE

## 2021-08-13 PROCEDURE — 80053 COMPREHEN METABOLIC PANEL: CPT | Performed by: EMERGENCY MEDICINE

## 2021-08-13 PROCEDURE — 87040 BLOOD CULTURE FOR BACTERIA: CPT | Performed by: EMERGENCY MEDICINE

## 2021-08-13 PROCEDURE — 71045 X-RAY EXAM CHEST 1 VIEW: CPT

## 2021-08-13 PROCEDURE — 85007 BL SMEAR W/DIFF WBC COUNT: CPT | Performed by: EMERGENCY MEDICINE

## 2021-08-13 PROCEDURE — 36415 COLL VENOUS BLD VENIPUNCTURE: CPT

## 2021-08-13 PROCEDURE — 25010000002 ONDANSETRON PER 1 MG: Performed by: EMERGENCY MEDICINE

## 2021-08-13 PROCEDURE — 83605 ASSAY OF LACTIC ACID: CPT | Performed by: EMERGENCY MEDICINE

## 2021-08-13 PROCEDURE — 99222 1ST HOSP IP/OBS MODERATE 55: CPT | Performed by: FAMILY MEDICINE

## 2021-08-13 PROCEDURE — 83880 ASSAY OF NATRIURETIC PEPTIDE: CPT | Performed by: EMERGENCY MEDICINE

## 2021-08-13 PROCEDURE — 85025 COMPLETE CBC W/AUTO DIFF WBC: CPT | Performed by: EMERGENCY MEDICINE

## 2021-08-13 PROCEDURE — 25010000002 KETOROLAC TROMETHAMINE PER 15 MG: Performed by: EMERGENCY MEDICINE

## 2021-08-13 PROCEDURE — 84484 ASSAY OF TROPONIN QUANT: CPT | Performed by: EMERGENCY MEDICINE

## 2021-08-13 RX ORDER — ONDANSETRON 2 MG/ML
4 INJECTION INTRAMUSCULAR; INTRAVENOUS ONCE
Status: COMPLETED | OUTPATIENT
Start: 2021-08-13 | End: 2021-08-13

## 2021-08-13 RX ORDER — CHOLECALCIFEROL (VITAMIN D3) 125 MCG
5 CAPSULE ORAL NIGHTLY PRN
Status: DISCONTINUED | OUTPATIENT
Start: 2021-08-13 | End: 2021-08-16 | Stop reason: HOSPADM

## 2021-08-13 RX ORDER — BISACODYL 5 MG/1
5 TABLET, DELAYED RELEASE ORAL DAILY PRN
Status: DISCONTINUED | OUTPATIENT
Start: 2021-08-13 | End: 2021-08-16 | Stop reason: HOSPADM

## 2021-08-13 RX ORDER — SODIUM CHLORIDE 0.9 % (FLUSH) 0.9 %
10 SYRINGE (ML) INJECTION AS NEEDED
Status: DISCONTINUED | OUTPATIENT
Start: 2021-08-13 | End: 2021-08-16 | Stop reason: HOSPADM

## 2021-08-13 RX ORDER — CALCIUM CARBONATE 200(500)MG
2 TABLET,CHEWABLE ORAL 2 TIMES DAILY PRN
Status: DISCONTINUED | OUTPATIENT
Start: 2021-08-13 | End: 2021-08-16 | Stop reason: HOSPADM

## 2021-08-13 RX ORDER — SODIUM CHLORIDE 0.9 % (FLUSH) 0.9 %
10 SYRINGE (ML) INJECTION AS NEEDED
Status: DISCONTINUED | OUTPATIENT
Start: 2021-08-13 | End: 2021-08-13

## 2021-08-13 RX ORDER — DEXAMETHASONE SODIUM PHOSPHATE 10 MG/ML
6 INJECTION INTRAMUSCULAR; INTRAVENOUS DAILY
Status: DISCONTINUED | OUTPATIENT
Start: 2021-08-14 | End: 2021-08-14

## 2021-08-13 RX ORDER — KETOROLAC TROMETHAMINE 30 MG/ML
30 INJECTION, SOLUTION INTRAMUSCULAR; INTRAVENOUS ONCE
Status: COMPLETED | OUTPATIENT
Start: 2021-08-13 | End: 2021-08-13

## 2021-08-13 RX ORDER — SODIUM CHLORIDE 0.9 % (FLUSH) 0.9 %
10 SYRINGE (ML) INJECTION EVERY 12 HOURS SCHEDULED
Status: DISCONTINUED | OUTPATIENT
Start: 2021-08-14 | End: 2021-08-16 | Stop reason: HOSPADM

## 2021-08-13 RX ORDER — ONDANSETRON 4 MG/1
4 TABLET, FILM COATED ORAL EVERY 6 HOURS PRN
Status: DISCONTINUED | OUTPATIENT
Start: 2021-08-13 | End: 2021-08-16 | Stop reason: HOSPADM

## 2021-08-13 RX ORDER — POLYETHYLENE GLYCOL 3350 17 G/17G
17 POWDER, FOR SOLUTION ORAL DAILY PRN
Status: DISCONTINUED | OUTPATIENT
Start: 2021-08-13 | End: 2021-08-16 | Stop reason: HOSPADM

## 2021-08-13 RX ORDER — BISACODYL 10 MG
10 SUPPOSITORY, RECTAL RECTAL DAILY PRN
Status: DISCONTINUED | OUTPATIENT
Start: 2021-08-13 | End: 2021-08-16 | Stop reason: HOSPADM

## 2021-08-13 RX ORDER — AMOXICILLIN 250 MG
2 CAPSULE ORAL 2 TIMES DAILY
Status: DISCONTINUED | OUTPATIENT
Start: 2021-08-14 | End: 2021-08-16 | Stop reason: HOSPADM

## 2021-08-13 RX ORDER — ONDANSETRON 2 MG/ML
4 INJECTION INTRAMUSCULAR; INTRAVENOUS EVERY 6 HOURS PRN
Status: DISCONTINUED | OUTPATIENT
Start: 2021-08-13 | End: 2021-08-16 | Stop reason: HOSPADM

## 2021-08-13 RX ADMIN — SODIUM CHLORIDE 1 G: 9 INJECTION INTRAMUSCULAR; INTRAVENOUS; SUBCUTANEOUS at 17:11

## 2021-08-13 RX ADMIN — ONDANSETRON 4 MG: 2 INJECTION INTRAMUSCULAR; INTRAVENOUS at 17:11

## 2021-08-13 RX ADMIN — SODIUM CHLORIDE 1000 ML: 9 INJECTION, SOLUTION INTRAVENOUS at 17:08

## 2021-08-13 RX ADMIN — KETOROLAC TROMETHAMINE 30 MG: 30 INJECTION, SOLUTION INTRAMUSCULAR; INTRAVENOUS at 17:11

## 2021-08-13 RX ADMIN — AZITHROMYCIN 500 MG: 500 INJECTION, POWDER, LYOPHILIZED, FOR SOLUTION INTRAVENOUS at 17:12

## 2021-08-13 RX ADMIN — ONDANSETRON 4 MG: 2 INJECTION INTRAMUSCULAR; INTRAVENOUS at 19:16

## 2021-08-13 NOTE — ED PROVIDER NOTES
Time: 3:23 PM EDT  Arrived by: Ambulance  Chief Complaint:   Chief Complaint   Patient presents with   • Shortness of Breath   • Fatigue   • Syncope     History provided by: Patient  History is limited by: N/A     History of Present Illness:      Carlota Hare is a 56 y.o. female who presents to the emergency department today with complaints of generalized weakness and shortness of breath. The patient reports that she was diagnosed with COVID-19 ten days ago. She states that her symptoms have worsened with time and that today, they have become severe. In addition to her weakness and shortness of breath, the patient has also had nausea, vomiting, diarrhea, and fatigue with her illness. Her vomiting and diarrhea do worsen with eating.    The patient has a history of anemia and sleep apnea. She denies smoking, drinking, or drug use. She denies being in the hospital over the past few months. There are no other acute complaints at this time.      History provided by:  Patient   used: No    Shortness of Breath  Severity:  Moderate  Onset quality:  Gradual  Duration:  10 days  Timing:  Constant  Progression:  Worsening  Chronicity:  New  Context comment:  Patient was diagnosed with COVID-19 ten days ago.  Relieved by:  None tried  Worsened by:  Nothing  Ineffective treatments:  None tried  Associated symptoms: vomiting    Associated symptoms: no chest pain, no neck pain and no rash    Risk factors: no recent alcohol use and no tobacco use    Weakness - Generalized  Severity:  Moderate  Onset quality:  Gradual  Duration:  10 days  Timing:  Constant  Progression:  Worsening  Chronicity:  New  Context comment:  Patient is COVID+.  Relieved by:  None tried  Worsened by:  Nothing  Ineffective treatments:  None tried  Associated symptoms: diarrhea, nausea, shortness of breath and vomiting    Associated symptoms: no chest pain, no dysuria, no frequency and no seizures    Risk factors: anemia        Similar  Symptoms Previously: No.  Recently seen: Patient was seen in urgent care on 8/3/2021 with myalgias.      Patient Care Team  Primary Care Provider: Ruchi Bradford APRN    Past Medical History:     Allergies   Allergen Reactions   • Codeine Unknown (See Comments)     She states she gets hyper   • Tramadol Unknown (See Comments)     Severe depression when coming off of it   • Demerol [Meperidine] Rash     Headache also   • Hydrocodone-Acetaminophen Rash   • Percocet [Oxycodone-Acetaminophen] Rash   • Tylenol [Acetaminophen] Rash     Pt stated  Headache and rash     Past Medical History:   Diagnosis Date   • Anemia    • Anxiety    • CPAP (continuous positive airway pressure) dependence    • H/O Lymphedema     Left arm and leg   • History of anemia    • History of foreign travel     [place] and [place]   • History of hypoglycemia    • History of vitamin D deficiency    • OAB (overactive bladder)    • RICHARD (obstructive sleep apnea)    • Poor vision      Past Surgical History:   Procedure Laterality Date   •  SECTION     • CHOLECYSTECTOMY     • DILATATION AND CURETTAGE     • HEMORRHOIDECTOMY     • LYMPH NODE DISSECTION     • NECK SURGERY     • ROTATOR CUFF REPAIR Right    • SHOULDER SURGERY Left 2018     Family History   Problem Relation Age of Onset   • Diabetes Mother    • Skin cancer Mother    • Diabetes Maternal Aunt    • Breast cancer Maternal Aunt    • Diabetes Other    • Hyperlipidemia Other    • Hypertension Other    • Lung cancer Other    • Clotting disorder Father        Home Medications:  Prior to Admission medications    Medication Sig Start Date End Date Taking? Authorizing Provider   ascorbic acid (VITAMIN C) 250 MG chewable tablet chewable tablet Vitamin C 250 mg oral tablet,chewable chew 1 tablet by oral route daily   Active    Emergency, Nurse Justin RN   Cyanocobalamin (VITAMIN B-12 PO) Take  by mouth.    Emergency, Nurse Justin RN   lisinopril (PRINIVIL,ZESTRIL) 10 MG tablet  "Take 1 tablet by mouth Daily. 8/3/21   Magdiel Darnell MD   Multiple Vitamin (MULTI-VITAMIN DAILY PO) Take  by mouth.    Provider, MD Joyce   Omega-3 Fatty Acids (FISH OIL ULTRA PO) Fish Oil 1,000 mg (120 mg-180 mg) oral capsule take 1 capsule by oral route daily   Active    Emergency, Nurse Justin, RN   promethazine-dextromethorphan (PROMETHAZINE-DM) 6.25-15 MG/5ML syrup Take 5 mL by mouth 2 (two) times a day. 8/11/21   Ruchi Bradford APRN   vitamin D (ERGOCALCIFEROL) 26094 units capsule capsule Take 50,000 Units by mouth 1 (One) Time Per Week. 4/10/18   Provider, MD Joyce   vitamin E 1000 UNIT capsule vitamin E 1,000 unit oral capsule take 1 capsule by oral route daily   Active    Emergency, Nurse Justin, RN        Social History:   Social History     Tobacco Use   • Smoking status: Never Smoker   • Smokeless tobacco: Never Used   Substance Use Topics   • Alcohol use: No   • Drug use: No     Recent travel: Unknown    Review of Systems:  Review of Systems   Constitutional: Positive for fatigue.   HENT: Negative for nosebleeds.    Eyes: Negative for redness.   Respiratory: Positive for shortness of breath.    Cardiovascular: Negative for chest pain.   Gastrointestinal: Positive for diarrhea, nausea and vomiting.   Genitourinary: Negative for dysuria and frequency.   Musculoskeletal: Negative for back pain and neck pain.   Skin: Negative for rash.   Neurological: Positive for weakness. Negative for seizures and syncope.        Physical Exam:  /75 (BP Location: Right arm, Patient Position: Sitting)   Pulse 81   Temp 97.3 °F (36.3 °C) (Oral)   Resp 18   Ht 167.6 cm (66\")   Wt 113 kg (250 lb)   SpO2 97%   Breastfeeding No   BMI 40.35 kg/m²     Physical Exam  Vitals and nursing note reviewed.   Constitutional:       General: She is not in acute distress.  HENT:      Head: Normocephalic and atraumatic.      Nose: Nose normal.      Mouth/Throat:      Mouth: Mucous membranes are moist. "   Eyes:      General: No scleral icterus.  Cardiovascular:      Rate and Rhythm: Normal rate and regular rhythm.      Heart sounds: Normal heart sounds. No murmur heard.     Pulmonary:      Effort: No respiratory distress.      Breath sounds: Normal breath sounds.   Abdominal:      Palpations: Abdomen is soft.      Tenderness: There is no abdominal tenderness.   Musculoskeletal:         General: No tenderness. Normal range of motion.      Cervical back: Normal range of motion and neck supple. No tenderness.      Right lower leg: No edema.      Left lower leg: No edema.   Skin:     General: Skin is warm and dry.   Neurological:      General: No focal deficit present.      Mental Status: She is alert.      Sensory: No sensory deficit.      Motor: No weakness.   Psychiatric:         Behavior: Behavior normal.                Medications in the Emergency Department:  Medications   sodium chloride 0.9 % flush 10 mL (10 mL Intravenous Given 8/16/21 0805)   sodium chloride 0.9 % flush 10 mL (10 mL Intravenous Given 8/14/21 0029)   calcium carbonate (TUMS) chewable tablet 500 mg (200 mg elemental) (has no administration in time range)   sennosides-docusate (PERICOLACE) 8.6-50 MG per tablet 2 tablet (2 tablets Oral Not Given 8/16/21 0805)     And   polyethylene glycol (MIRALAX) packet 17 g (has no administration in time range)     And   bisacodyl (DULCOLAX) EC tablet 5 mg (has no administration in time range)     And   bisacodyl (DULCOLAX) suppository 10 mg (has no administration in time range)   ondansetron (ZOFRAN) tablet 4 mg (has no administration in time range)     Or   ondansetron (ZOFRAN) injection 4 mg (has no administration in time range)   melatonin tablet 5 mg (has no administration in time range)   enoxaparin (LOVENOX) syringe 40 mg (40 mg Subcutaneous Given 8/16/21 0804)   cefTRIAXone (ROCEPHIN) 2 g/100 mL 0.9% NS IVPB (MBP) (2 g Intravenous New Bag 8/16/21 0803)   azithromycin (ZITHROMAX) 500 mg/250 mL NS (500  mg Intravenous New Bag 8/16/21 0804)   !Patient Home Medications Stored in Pharmacy ( Does not apply Given 8/16/21 0805)   dexamethasone (DECADRON) injection 10 mg (10 mg Intravenous Given 8/16/21 0804)   cefTRIAXone (ROCEPHIN) in NS 1 gram/10ml IV PUSH syringe (1 g Intravenous Given 8/13/21 1711)   azithromycin (ZITHROMAX) 500 mg/250 mL NS (0 mg Intravenous Stopped 8/13/21 1916)   ketorolac (TORADOL) injection 30 mg (30 mg Intravenous Given 8/13/21 1711)   ondansetron (ZOFRAN) injection 4 mg (4 mg Intravenous Given 8/13/21 1711)   sodium chloride 0.9 % bolus 1,000 mL (1,000 mL Intravenous New Bag 8/13/21 1708)   ondansetron (ZOFRAN) injection 4 mg (4 mg Intravenous Given 8/13/21 1916)   lactated ringers bolus 1,000 mL (1,000 mL Intravenous New Bag 8/14/21 0028)        Labs  Lab Results (last 24 hours)     ** No results found for the last 24 hours. **           Imaging:  No Radiology Exams Resulted Within Past 24 Hours    Procedures:  Procedures    Progress                            Medical Decision Making:  MDM     56-year-old female patient presented with shortness of breath. Patient has had recent coded infection which she was recovering from but then started having worsening of symptoms. Patients workup shows evidence for pneumonia involving the right upper lobe.  Patient is febrile on arrival in the pulse ox drops below 90% on room air. She improves to 98% on oxygen. IV antibiotics were initiated in the emergency department.   patient will be admitted to the hospital service.    Differential diagnosis for this patient included but was not limited to cover 19 infection, pneumonia, CHF, COPD.      Final diagnoses:   Pneumonia of right upper lobe due to infectious organism        Disposition:  ED Disposition     ED Disposition Condition Comment    Decision to Admit  Level of Care: Med/Surg [1]   Diagnosis: Pneumonia of right upper lobe due to infectious organism [[id number]]   Admitting Physician: NICKY  OC JOHNSON [[id number]]   Attending Physician: OC SAUNDERS [[id number]]   Isolate for COVID?: Yes [1]   Certification: I Certify That Inpatient Hospital Services Are Medically Necessary For Greater Than 2 Midnights            Documentation assistance provided by Shaina Hayes acting as scribe for Dr. Jay Rose. Information recorded by the scribe was done at my direction and has been verified and validated by me.        Shaina Hayes  08/13/21 1643       Shaina Hayes  08/13/21 3465       Jay Rose DO  08/16/21 4126

## 2021-08-13 NOTE — ED NOTES
"Pt states she was diagnosed on Tuesday august 3rd with covid. Symptoms continued to increase in severity as days went on. Pt states today as been at its worst. She states \" I feel like any time I move my body just gives out and then anytime I eat or drink it comes right back up.\"      Tiffanie Garcia RN  08/13/21 [id number]    "

## 2021-08-14 LAB
CRP SERPL-MCNC: 2.99 MG/DL (ref 0–0.5)
FERRITIN SERPL-MCNC: 401.6 NG/ML (ref 13–150)
LDH SERPL-CCNC: 226 U/L (ref 135–214)
PROCALCITONIN SERPL-MCNC: 0.1 NG/ML (ref 0–0.25)

## 2021-08-14 PROCEDURE — 86140 C-REACTIVE PROTEIN: CPT | Performed by: FAMILY MEDICINE

## 2021-08-14 PROCEDURE — 94799 UNLISTED PULMONARY SVC/PX: CPT

## 2021-08-14 PROCEDURE — 99233 SBSQ HOSP IP/OBS HIGH 50: CPT | Performed by: INTERNAL MEDICINE

## 2021-08-14 PROCEDURE — 25010000002 DEXAMETHASONE PER 1 MG: Performed by: FAMILY MEDICINE

## 2021-08-14 PROCEDURE — 83615 LACTATE (LD) (LDH) ENZYME: CPT | Performed by: FAMILY MEDICINE

## 2021-08-14 PROCEDURE — 82728 ASSAY OF FERRITIN: CPT | Performed by: FAMILY MEDICINE

## 2021-08-14 PROCEDURE — 25010000002 ENOXAPARIN PER 10 MG: Performed by: FAMILY MEDICINE

## 2021-08-14 PROCEDURE — 84145 PROCALCITONIN (PCT): CPT | Performed by: INTERNAL MEDICINE

## 2021-08-14 PROCEDURE — 25010000002 AZITHROMYCIN PER 500 MG: Performed by: FAMILY MEDICINE

## 2021-08-14 PROCEDURE — 25010000002 CEFTRIAXONE PER 250 MG: Performed by: FAMILY MEDICINE

## 2021-08-14 RX ORDER — DEXAMETHASONE SODIUM PHOSPHATE 10 MG/ML
10 INJECTION INTRAMUSCULAR; INTRAVENOUS DAILY
Status: DISCONTINUED | OUTPATIENT
Start: 2021-08-15 | End: 2021-08-16 | Stop reason: HOSPADM

## 2021-08-14 RX ADMIN — SODIUM CHLORIDE, POTASSIUM CHLORIDE, SODIUM LACTATE AND CALCIUM CHLORIDE 1000 ML: 600; 310; 30; 20 INJECTION, SOLUTION INTRAVENOUS at 00:28

## 2021-08-14 RX ADMIN — SODIUM CHLORIDE, PRESERVATIVE FREE 10 ML: 5 INJECTION INTRAVENOUS at 00:30

## 2021-08-14 RX ADMIN — CEFTRIAXONE 2 G: 2 INJECTION, POWDER, FOR SOLUTION INTRAMUSCULAR; INTRAVENOUS at 09:06

## 2021-08-14 RX ADMIN — SODIUM CHLORIDE, PRESERVATIVE FREE 10 ML: 5 INJECTION INTRAVENOUS at 00:29

## 2021-08-14 RX ADMIN — AZITHROMYCIN 500 MG: 500 INJECTION, POWDER, LYOPHILIZED, FOR SOLUTION INTRAVENOUS at 09:59

## 2021-08-14 RX ADMIN — SODIUM CHLORIDE, PRESERVATIVE FREE 10 ML: 5 INJECTION INTRAVENOUS at 09:06

## 2021-08-14 RX ADMIN — Medication: at 09:39

## 2021-08-14 RX ADMIN — ENOXAPARIN SODIUM 40 MG: 40 INJECTION SUBCUTANEOUS at 09:06

## 2021-08-14 RX ADMIN — SODIUM CHLORIDE, PRESERVATIVE FREE 10 ML: 5 INJECTION INTRAVENOUS at 20:34

## 2021-08-14 RX ADMIN — DEXAMETHASONE SODIUM PHOSPHATE 6 MG: 10 INJECTION INTRAMUSCULAR; INTRAVENOUS at 00:28

## 2021-08-14 NOTE — H&P
History and Physical   Carlota Hare , :  1965, MRN:  [id number]    Chief complaint: Weakness, shortness of breath    Assessment/Plan       Right upper lobe pneumonia    COVID-19 virus infection    Morbid obesity (CMS/HCC)    Hypotension    RICHARD (obstructive sleep apnea)    Acute respiratory failure with hypoxia (CMS/HCC)      • Right upper lobe pneumonia: The patient presented to the emergency department with worsening weakness, shortness of breath, cough.  She was diagnosed with COVID-19 on 2021.  Work-up in the emergency department shows mild hypoxia requiring 2 L nasal cannula.  Chest x-ray shows a right upper lobe pneumonia.  The patient's curb 65 score puts her at intermediate risk, but with need for oxygen will admit to the hospital.  Start on Rocephin, azithromycin.    • Acute respiratory failure with hypoxia: The patient is mildly hypoxic requiring 2 L nasal cannula to maintain her oxygen saturations.  Will admit to the hospital, [facility].  Check Covid labs prior to consideration for remdesivir.    • COVID-19: Diagnosed with COVID-19 10 days ago.  She was asymptomatic at the time.  Now with weakness, nausea, vomiting, diarrhea.  She is in the window for potential worsening.  Will monitor closely.  Start on Decadron.  Obtaining COVID-19 labs, holding on remdesivir for now.    • Hypotension: The patient states she was seen in urgent care 10 days ago, diagnosed with hypertension and started on 10 mg of lisinopril.  Since that time she has been having syncopal episodes at home.  At the time of my evaluation the patient's blood pressure was in the 90s systolic.  Holding on lisinopril.  Gentle IV fluid hydration.    • Obstructive sleep apnea: May use CPAP at night.    • Morbid obesity: BMI of 40.53.  Recommend improve diet and activity resolution of acute illness.    • Clinical course will dictate further medical management.    DVT Prophylaxis: Lovenox  Code Status: Full    Reviewed patients labs and  imaging, and discussed with patient and nurse at bedside.    Patient risk level:  Patient comorbidities and risk factors make patient a poor candidate for outpatient management due to concerns of deterioration.    Total time spent on admission: 70 minutes    History of Present illness     Carlota Hare is a 56 y.o. old female patient who presents with shortness of breath, weakness, nausea, vomiting.  The patient has history of obesity, lymphedema, obstructive sleep apnea.    The patient states that she felt like her blood pressure was high approximately 10 days ago.  She was seen at urgent care where she was diagnosed with COVID-19.  She was started on 10 mg of lisinopril at that time.  The patient states since then she has had nausea, vomiting, diarrhea.  She reports decreased oral intake.  She reports increasing shortness of breath with cough.  She reports generalized weakness.  For this reason she presented to the emergency department.    ED course: On arrival to the emergency department the patient was febrile at 100.5 °F.  She was mildly hypoxic and placed on 2 L nasal cannula.  Blood work showed no abnormalities.  Lactate was normal.  Chest x-ray shows a right upper lobe pneumonia.  She was started on Rocephin, azithromycin and the hospital service was asked to admit the patient for further evaluation and management.    Old records were reviewed which revealed no previous hospitalizations at this facility.    Review of Systems     General: Reports fevers, chills.  Denies weight loss/gain or night sweats.  HEENT: Denies dysphagia, hearing changes, rhinorrhea, visual changes or nasal congestion.  Respiratory: Reports cough, dyspnea.  Denies sputum changes, wheezing or hemoptysis.  Denies pleuritic chest pain.  Cardiovascular: Denies chest pain, palpitations.  Reports dyspnea on exertion without orthopnea. Denies chest pressure. Denies lower extremity edema.  Gastrointestinal: Denies abdominal pain.  Reports  nausea, vomiting and diarrhea.  Denies bright red blood per rectum, melena or cramping.  Genitourinary: Denies dysuria, frequency or hesitancy. Denies urgency or hematuria.  Musculoskeletal: Denies joint effusions, joint tenderness, joint stiffness or myalgias.  Complains of generalized weakness.  Endocrine: Denies heat or cold intolerance.  Reports fatigue.  Hematologic: Denies bleeding, bruising or swollen glands.  Psychiatric: Denies anxiety or depression.  Neurologic: Denies confusion, headaches, numbness or visual changes.  Skin: Denies rash, edema or open wounds.    Past Medical/Surgical/Social/Family History/Allergies     Past Medical History:   Diagnosis Date   • Anemia    • Anxiety    • CPAP (continuous positive airway pressure) dependence    • H/O Lymphedema     Left arm and leg   • History of anemia    • History of foreign travel     [place] and [place]   • History of hypoglycemia    • History of vitamin D deficiency    • OAB (overactive bladder)    • RICHARD (obstructive sleep apnea)    • Poor vision        Past Surgical History:   Procedure Laterality Date   •  SECTION     • CHOLECYSTECTOMY     • DILATATION AND CURETTAGE     • HEMORRHOIDECTOMY     • LYMPH NODE DISSECTION     • NECK SURGERY     • ROTATOR CUFF REPAIR Right    • SHOULDER SURGERY Left 2018       Social History     Socioeconomic History   • Marital status:      Spouse name: Eddie   • Number of children: 1   • Years of education: College   • Highest education level: Not on file   Tobacco Use   • Smoking status: Never Smoker   • Smokeless tobacco: Never Used   Substance and Sexual Activity   • Alcohol use: No   • Drug use: No       Family History   Problem Relation Age of Onset   • Diabetes Mother    • Skin cancer Mother    • Diabetes Maternal Aunt    • Breast cancer Maternal Aunt    • Diabetes Other    • Hyperlipidemia Other    • Hypertension Other    • Lung cancer Other    • Clotting disorder Father         Allergies   Allergen Reactions   • Codeine Unknown (See Comments)     She states she gets hyper   • Tramadol Unknown (See Comments)     Severe depression when coming off of it   • Demerol [Meperidine] Rash     Headache also   • Hydrocodone-Acetaminophen Rash   • Percocet [Oxycodone-Acetaminophen] Rash   • Tylenol [Acetaminophen] Rash     Pt stated  Headache and rash       The above past medical history, past surgical history, social history, family history allergies and home medications were personally reviewed by me today and corrected as needed  Home Medications   (Not in a hospital admission)    Physical Exam   Vital signs:  Temperature Blood Pressure Heart Rate Resp Rate SpO2   Temp: 98.5 °F (36.9 °C) BP: 126/84 Heart Rate: 74 Resp: 16 SpO2: 98 %     HEENT: Head is atraumatic, extraocular movements are intact. Oropharynx is normal.  Neck: Supple, no cervical lymphadenopathy.  Cardiovascular: Regular rate and rhythm. No murmurs, gallops or rubs. No peripheral edema. No JVD.  Lungs: Coarse bibasilar breath sounds.  No wheezes.  Abdomen: Normal bowel sounds in all 4 quadrants. No rebound, guarding or tenderness.  Morbidly obese abdomen.  Chest: Normal inspection. Equal rise and fall. No retractions noted.  Constitutional: Ill-appearing, no apparent distress.  Lymphatic: No cervical lymphadenopathy.  Extremities: 5/5 upper and lower extremity strength. Normal range of motion.  No clubbing, cyanosis or edema.  Neurological: Alert and oriented x3. No numbness or tingling.  Psychological: Normal mood and affect. Well kempt. Normal eye contact.  Skin: No rash, cellulitis, swelling or bruising.    Labs     Results from last 7 days   Lab Units 08/13/21  1645   WBC 10*3/mm3 5.83   HEMOGLOBIN g/dL 15.5   HEMATOCRIT % 47.4*   PLATELETS 10*3/mm3 125*     Results from last 7 days   Lab Units 08/13/21  1644   SODIUM mmol/L 139   POTASSIUM mmol/L 3.6   CHLORIDE mmol/L 103   CO2 mmol/L 22.3   BUN mg/dL 22*   CREATININE  mg/dL 0.96   CALCIUM mg/dL 8.8     Results from last 7 days   Lab Units 08/13/21  1644   ALT (SGPT) U/L 40*   AST (SGOT) U/L 32   ALK PHOS U/L 120*   BILIRUBIN mg/dL 0.6                   Invalid input(s): CPK, MASSCKMB        I reviewed patient's labs from today and also previous labs while reviewing old records   Imaging and Other procedures     Chest X ray: My independent assessment showed bibasilar infiltrates.    I reviewed patient's imaging and other testing from today and also in the past including but not limited to imaging, previous imaging, EKG, previous EKGs, echocardiogram, and other procedures if any and previously done by reviewing old records  Current Medications   Scheduled Meds:   Continuous Infusions:     [unfilled]  08/13/21  21:36 EDT

## 2021-08-14 NOTE — CASE MANAGEMENT/SOCIAL WORK
Discharge Planning Assessment   [facility]     Patient Name: Carlota Hare  MRN: [id number]  Today's Date: 8/14/2021    Admit Date: 8/13/2021    Discharge Needs Assessment     Row Name 08/14/21 0900       Living Environment    Lives With  spouse    Name(s) of Who Lives With Patient  Eddie    Current Living Arrangements  home/apartment/condo    Primary Care Provided by  self    Provides Primary Care For  no one    Family Caregiver if Needed  spouse    Quality of Family Relationships  helpful;involved;supportive    Able to Return to Prior Arrangements  yes       Resource/Environmental Concerns    Resource/Environmental Concerns  none       Transition Planning    Patient/Family Anticipates Transition to  home    Patient/Family Anticipated Services at Transition  durable medical equipment    Transportation Anticipated  car, drives self;family or friend will provide       Discharge Needs Assessment    Readmission Within the Last 30 Days  no previous admission in last 30 days    Equipment Currently Used at Home  nebulizer    Concerns to be Addressed  discharge planning    Equipment Needed After Discharge  oxygen;respiratory supplies        Discharge Plan     Row Name 08/14/21 0902       Plan    Plan  COVID +, not vaccinated. Pt currently requiring 2L NC, does not use home 02 at baseline. Information for assessment obtained from , Eddie who is currently in quarantine. Pt indpendent at home, transports self. Pt will need 6MWT prior to dc, no preference on DME agency for home 02. Pt has home nebulizer. CM verfied demographics, PCP, pharmacy and updated contact information. Pt will have transportation home on dc. CM will follow and assist for dc planning.    Patient/Family in Agreement with Plan  yes        Continued Care and Services - Admitted Since 8/13/2021    Coordination has not been started for this encounter.         Demographic Summary     Row Name 08/14/21 0900       General Information    Admission Type   inpatient    Arrived From  emergency department    Referral Source  admission list    Reason for Consult  discharge planning    Preferred Language  English     Used During This Interaction  no       Contact Information    Permission Granted to Share Info With  ;family/designee        Functional Status     Row Name 08/14/21 0900       Functional Status    Usual Activity Tolerance  good    Current Activity Tolerance  good       Functional Status, IADL    Medications  independent    Meal Preparation  independent    Housekeeping  independent    Laundry  independent    Shopping  independent       Mental Status    General Appearance WDL  WDL       Mental Status Summary    Recent Changes in Mental Status/Cognitive Functioning  no changes       Employment/    Employment Status  employed full-time    Shift Worked  first shift    Current or Previous Occupation  professional        Psychosocial    No documentation.       Abuse/Neglect    No documentation.       Legal    No documentation.       Substance Abuse    No documentation.       Patient Forms    No documentation.           Ayana Avalos RN

## 2021-08-14 NOTE — PLAN OF CARE
Goal Outcome Evaluation:  Plan of Care Reviewed With: patient           Outcome Summary: REMAINS ON 2 LPM CLAUDIA DUGGAN IN 90'S.

## 2021-08-14 NOTE — PROGRESS NOTES
[facility]   Hospitalist Progress Note  Date: 2021  Patient Name: Carlota Hare  : 1965  MRN: [id number]  Date of admission: 2021      Subjective   Subjective     Chief Complaint: covid     Summary:   56 y.o. old female patient who presents with shortness of breath, weakness, nausea, vomiting.  The patient has history of obesity, lymphedema, obstructive sleep apnea.The patient states that she felt like her blood pressure was high approximately 10 days ago.  She was seen at urgent care where she was diagnosed with COVID-19.  She was started on 10 mg of lisinopril at that time.  The patient states since then she has had nausea, vomiting, diarrhea.  She reports decreased oral intake.  She reports increasing shortness of breath with cough.  She reports generalized weakness.  For this reason she presented to the emergency department.  On arrival to the emergency department the patient was febrile at 100.5 °F.  She was mildly hypoxic and placed on 2 L nasal cannula.  Blood work showed no abnormalities.  Lactate was normal.  Chest x-ray shows a right upper lobe pneumonia.  She was started on Rocephin, azithromycin and the hospital service was asked to admit the patient for further evaluation and management.    Interval Followup: Patient states that she is feeling much better today.  Patient has no nausea or vomiting is able to tolerate a diet.  Patient does still feel short of breath with a cough and overall fatigue body aches    Review of Systems  History obtained from the patient  General ROS: Positive for Fatigue, positive for - chills, fever, malaise, or night sweats  Psychological ROS: negative for - anxiety, depression, hallucinations, or mood swings  Ophthalmic ROS: negative for - blurry vision, double vision, or itchy eyes  ENT ROS: negative for - headaches, nasal congestion, sneezing, or sore throat  Hematological and Lymphatic ROS: negative for - bleeding problems, bruising, or  jaundice  Respiratory ROS: positive for - cough, shortness of breath, sputum changes, tachypnea, or wheezing  Cardiovascular ROS: negative for - chest pain, dyspnea on exertion, edema, palpitations, or paroxysmal nocturnal dyspnea  Gastrointestinal ROS: Positive for occasional intermittent nausea vomiting diarrhea  Genito-Urinary ROS: negative for - change in urinary stream, hematuria, incontinence, or urinary frequency/urgency  Musculoskeletal ROS: negative for - joint stiffness, joint swelling, muscle pain, or muscular weakness  Neurological ROS: negative for - confusion, dizziness, gait disturbance, headaches, impaired coordination/balance, memory loss, numbness/tingling, seizures, or visual changes  Dermatological ROS: negative for dry skin and rash       Objective   Objective     Vitals:   Temp:  [97.5 °F (36.4 °C)-98.5 °F (36.9 °C)] 97.7 °F (36.5 °C)  Heart Rate:  [73-84] 73  Resp:  [13-30] 22  BP: ()/(59-89) 140/74  Flow (L/min):  [2] 2  Physical Exam    Constitutional: Awake, alert, no acute distress.  Sitting in bed eating lunch   Eyes: Pupils equal, sclerae anicteric, no conjunctival injection   HENT: NCAT, mucous membranes moist   Neck: Supple   Respiratory: Clear to auscultation bilaterally, nonlabored respirations.  No wheezing rales or rhonchi   Cardiovascular: RRR, no murmurs, rubs, or gallops, palpable pedal pulses bilaterally   Gastrointestinal: Positive bowel sounds, soft, nontender, nondistended   Musculoskeletal: No bilateral ankle edema, no clubbing or cyanosis to extremities   Psychiatric: Appropriate affect, cooperative   Neurologic: Oriented x 3, strength symmetric in all extremities, Cranial Nerves grossly intact to confrontation, speech clear   Skin: No rashes     Result Review    Result Review:  I have personally reviewed the results from the time of this admission to 8/14/2021 16:25 EDT and agree with these findings:  [x]  Laboratory  [x]  Microbiology  [x]  Radiology  [x]   EKG/Telemetry   [x]  Cardiology/Vascular   [x]  Pathology  []  Old records  []  Other:    Assessment/Plan   Assessment / Plan     Assessment/Plan:    Right upper lobe pneumonia    COVID-19 virus infection    Morbid obesity (CMS/HCC)    Hypotension    RICHARD (obstructive sleep apnea)    Acute respiratory failure with hypoxia (CMS/HCC)     PLAN  Obtain CT of the chest given patient appears to have a new right upper lobe pneumonia  Continue patient on steroids to complete 10 days  At this time patient is not a candidate for dabs of air since she is more than 10 days out from initial infection  Continue antiemetics as needed  Continue patient on bronchodilators  Continue patient on IV antibiotics  Blood cultures pending  Obtain procalcitonin  Wean oxygen as tolerated       Discussed plan with RN.    DVT prophylaxis: Lovenox   Medical and mechanical DVT prophylaxis orders are present.    CODE STATUS:   Level Of Support Discussed With: Patient  Code Status: CPR  Medical Interventions (Level of Support Prior to Arrest): Full        Electronically signed by Reginaldo Saldivar DO, 08/14/21, 4:25 PM EDT.

## 2021-08-15 ENCOUNTER — APPOINTMENT (OUTPATIENT)
Dept: CT IMAGING | Facility: HOSPITAL | Age: 56
End: 2021-08-15

## 2021-08-15 PROCEDURE — 25010000002 AZITHROMYCIN PER 500 MG: Performed by: FAMILY MEDICINE

## 2021-08-15 PROCEDURE — 25010000002 ENOXAPARIN PER 10 MG: Performed by: FAMILY MEDICINE

## 2021-08-15 PROCEDURE — 25010000002 DEXAMETHASONE PER 1 MG: Performed by: INTERNAL MEDICINE

## 2021-08-15 PROCEDURE — 71250 CT THORAX DX C-: CPT

## 2021-08-15 PROCEDURE — 25010000002 CEFTRIAXONE PER 250 MG: Performed by: FAMILY MEDICINE

## 2021-08-15 PROCEDURE — 99232 SBSQ HOSP IP/OBS MODERATE 35: CPT | Performed by: INTERNAL MEDICINE

## 2021-08-15 PROCEDURE — 94799 UNLISTED PULMONARY SVC/PX: CPT

## 2021-08-15 RX ADMIN — ENOXAPARIN SODIUM 40 MG: 40 INJECTION SUBCUTANEOUS at 08:10

## 2021-08-15 RX ADMIN — CEFTRIAXONE 2 G: 2 INJECTION, POWDER, FOR SOLUTION INTRAMUSCULAR; INTRAVENOUS at 08:11

## 2021-08-15 RX ADMIN — AZITHROMYCIN 500 MG: 500 INJECTION, POWDER, LYOPHILIZED, FOR SOLUTION INTRAVENOUS at 08:10

## 2021-08-15 RX ADMIN — SODIUM CHLORIDE, PRESERVATIVE FREE 10 ML: 5 INJECTION INTRAVENOUS at 20:02

## 2021-08-15 RX ADMIN — Medication: at 08:11

## 2021-08-15 RX ADMIN — SODIUM CHLORIDE, PRESERVATIVE FREE 10 ML: 5 INJECTION INTRAVENOUS at 08:10

## 2021-08-15 RX ADMIN — DEXAMETHASONE SODIUM PHOSPHATE 10 MG: 10 INJECTION INTRAMUSCULAR; INTRAVENOUS at 08:10

## 2021-08-15 NOTE — PROGRESS NOTES
[facility]   Hospitalist Progress Note  Date: 8/15/2021  Patient Name: Carlota Hare  : 1965  MRN: [id number]  Date of admission: 2021      Subjective   Subjective     Chief Complaint: covid     Summary:   56 y.o. old female patient who presents with shortness of breath, weakness, nausea, vomiting.  The patient has history of obesity, lymphedema, obstructive sleep apnea.The patient states that she felt like her blood pressure was high approximately 10 days ago.  She was seen at urgent care where she was diagnosed with COVID-19.  She was started on 10 mg of lisinopril at that time.  The patient states since then she has had nausea, vomiting, diarrhea.  She reports decreased oral intake.  She reports increasing shortness of breath with cough.  She reports generalized weakness.  For this reason she presented to the emergency department.  On arrival to the emergency department the patient was febrile at 100.5 °F.  She was mildly hypoxic and placed on 2 L nasal cannula.  Blood work showed no abnormalities.  Lactate was normal.  Chest x-ray shows a right upper lobe pneumonia.  She was started on Rocephin, azithromycin and the hospital service was asked to admit the patient for further evaluation and management.    Interval Followup: Patient states that she is feeling much better today.  Patient has no nausea or vomiting is able to tolerate a diet.  Patient does still feel short of breath with a cough and overall fatigue body aches  Patient is on room air.     Review of Systems  History obtained from the patient  General ROS: Positive for Fatigue, positive for - chills, fever, malaise, or night sweats  Psychological ROS: negative for - anxiety, depression, hallucinations, or mood swings  Ophthalmic ROS: negative for - blurry vision, double vision, or itchy eyes  ENT ROS: negative for - headaches, nasal congestion, sneezing, or sore throat  Hematological and Lymphatic ROS: negative for - bleeding problems,  bruising, or jaundice  Respiratory ROS: positive for - cough, shortness of breath, sputum changes, tachypnea, or wheezing  Cardiovascular ROS: negative for - chest pain, dyspnea on exertion, edema, palpitations, or paroxysmal nocturnal dyspnea  Gastrointestinal ROS: Positive for occasional intermittent nausea vomiting diarrhea  Genito-Urinary ROS: negative for - change in urinary stream, hematuria, incontinence, or urinary frequency/urgency  Musculoskeletal ROS: negative for - joint stiffness, joint swelling, muscle pain, or muscular weakness  Neurological ROS: negative for - confusion, dizziness, gait disturbance, headaches, impaired coordination/balance, memory loss, numbness/tingling, seizures, or visual changes  Dermatological ROS: negative for dry skin and rash       Objective   Objective     Vitals:   Temp:  [97.3 °F (36.3 °C)-97.9 °F (36.6 °C)] 97.9 °F (36.6 °C)  Heart Rate:  [63-78] 71  Resp:  [16-22] 18  BP: (129-153)/(72-78) 129/72  Flow (L/min):  [2] 2  Physical Exam    Constitutional: Awake, alert, no acute distress.  Sitting in bed eating lunch   Eyes: Pupils equal, sclerae anicteric, no conjunctival injection   HENT: NCAT, mucous membranes moist   Neck: Supple   Respiratory: Clear to auscultation bilaterally, nonlabored respirations.  No wheezing rales or rhonchi   Cardiovascular: RRR, no murmurs, rubs, or gallops, palpable pedal pulses bilaterally   Gastrointestinal: Positive bowel sounds, soft, nontender, nondistended   Musculoskeletal: No bilateral ankle edema, no clubbing or cyanosis to extremities   Psychiatric: Appropriate affect, cooperative   Neurologic: Oriented x 3, strength symmetric in all extremities, Cranial Nerves grossly intact to confrontation, speech clear   Skin: No rashes     Result Review    Result Review:  I have personally reviewed the results from the time of this admission to 8/15/2021 16:34 EDT and agree with these findings:  [x]  Laboratory  [x]  Microbiology  [x]   Radiology  [x]  EKG/Telemetry   [x]  Cardiology/Vascular   [x]  Pathology  []  Old records  []  Other:    Assessment/Plan   Assessment / Plan     Assessment/Plan:    Right upper lobe pneumonia    COVID-19 virus infection    Morbid obesity (CMS/HCC)    Hypotension    RICHARD (obstructive sleep apnea)    Acute respiratory failure with hypoxia (CMS/[facility])     PLAN  CT reviewed and just showed multi-focal pneumonia   Continue patient on steroids to complete 10 days  At this time patient is not a candidate for remdesivir since she is more than 10 days out from initial infection  Continue antiemetics as needed  Continue patient on bronchodilators  Continue patient on IV antibiotics  Blood cultures pending  procalcitonin was normal   Wean oxygen as tolerated       Discussed plan with RN.  Home in the next 1-2 days     DVT prophylaxis: Lovenox   Medical and mechanical DVT prophylaxis orders are present.    CODE STATUS:   Level Of Support Discussed With: Patient  Code Status: CPR  Medical Interventions (Level of Support Prior to Arrest): Full

## 2021-08-15 NOTE — PLAN OF CARE
Goal Outcome Evaluation:  Plan of Care Reviewed With: patient           Outcome Summary: Sats maintained on 2 liters, rested quietly

## 2021-08-16 ENCOUNTER — READMISSION MANAGEMENT (OUTPATIENT)
Dept: CALL CENTER | Facility: HOSPITAL | Age: 56
End: 2021-08-16

## 2021-08-16 VITALS
DIASTOLIC BLOOD PRESSURE: 75 MMHG | TEMPERATURE: 97.3 F | HEIGHT: 66 IN | HEART RATE: 81 BPM | RESPIRATION RATE: 18 BRPM | BODY MASS INDEX: 40.18 KG/M2 | WEIGHT: 250 LBS | OXYGEN SATURATION: 97 % | SYSTOLIC BLOOD PRESSURE: 128 MMHG

## 2021-08-16 PROBLEM — D89.831 CYTOKINE RELEASE SYNDROME, GRADE 1: Status: ACTIVE | Noted: 2021-08-16

## 2021-08-16 PROCEDURE — 25010000002 AZITHROMYCIN PER 500 MG: Performed by: FAMILY MEDICINE

## 2021-08-16 PROCEDURE — 25010000002 DEXAMETHASONE PER 1 MG: Performed by: INTERNAL MEDICINE

## 2021-08-16 PROCEDURE — 99239 HOSP IP/OBS DSCHRG MGMT >30: CPT | Performed by: INTERNAL MEDICINE

## 2021-08-16 PROCEDURE — 25010000002 CEFTRIAXONE PER 250 MG: Performed by: FAMILY MEDICINE

## 2021-08-16 PROCEDURE — 94799 UNLISTED PULMONARY SVC/PX: CPT

## 2021-08-16 PROCEDURE — 25010000002 ENOXAPARIN PER 10 MG: Performed by: FAMILY MEDICINE

## 2021-08-16 RX ORDER — ALBUTEROL SULFATE 0.63 MG/3ML
1 SOLUTION RESPIRATORY (INHALATION) EVERY 6 HOURS PRN
Qty: 120 EACH | Refills: 0 | Status: SHIPPED | OUTPATIENT
Start: 2021-08-16 | End: 2021-09-15

## 2021-08-16 RX ADMIN — AZITHROMYCIN 500 MG: 500 INJECTION, POWDER, LYOPHILIZED, FOR SOLUTION INTRAVENOUS at 08:04

## 2021-08-16 RX ADMIN — SODIUM CHLORIDE, PRESERVATIVE FREE 10 ML: 5 INJECTION INTRAVENOUS at 08:05

## 2021-08-16 RX ADMIN — Medication: at 08:05

## 2021-08-16 RX ADMIN — ENOXAPARIN SODIUM 40 MG: 40 INJECTION SUBCUTANEOUS at 08:04

## 2021-08-16 RX ADMIN — CEFTRIAXONE 2 G: 2 INJECTION, POWDER, FOR SOLUTION INTRAMUSCULAR; INTRAVENOUS at 08:03

## 2021-08-16 RX ADMIN — DEXAMETHASONE SODIUM PHOSPHATE 10 MG: 10 INJECTION INTRAMUSCULAR; INTRAVENOUS at 08:04

## 2021-08-16 NOTE — OUTREACH NOTE
Prep Survey      Responses   [facility] facility patient discharged from?  [facility]   Is LACE score < 7 ?  Yes   Emergency Room discharge w/ pulse ox?  No   Eligibility  [facility]   Date of Admission  08/13/21   Date of Discharge  08/16/21   Discharge Disposition  Home or Self Care   Discharge diagnosis  COVID-19 virus infection Right upper lobe pneumonia Acute respiratory failure with hypoxia    Does the patient have one of the following disease processes/diagnoses(primary or secondary)?  COVID-19   Does the patient have Home health ordered?  No   Is there a DME ordered?  No   Prep survey completed?  Yes          Indira Romero RN

## 2021-08-16 NOTE — DISCHARGE SUMMARY
[facility]         HOSPITALIST  DISCHARGE SUMMARY    Patient Name: Carlota Hare  : 1965  MRN: [id number]    Date of Admission: 2021  Date of Discharge: 2021    Primary Care Physician: Ruchi Bradford APRN    Consults     Date and Time Order Name Status Description    2021  8:25 PM Inpatient Hospitalist Consult Completed           Active and Resolved Hospital Problems:  Active Hospital Problems    Diagnosis POA   • **Right upper lobe pneumonia [J18.9] Yes   • Cytokine release syndrome, grade 1 [D89.831] Unknown   • COVID-19 virus infection [U07.1] Yes   • Morbid obesity (CMS/HCC) [E66.01] Yes   • Hypotension [I95.9] Yes   • RICHARD (obstructive sleep apnea) [G47.33] Yes   • Acute respiratory failure with hypoxia (CMS/HCC) [J96.01] Yes      Resolved Hospital Problems   No resolved problems to display.       Hospital Course     Hospital Course:  Carlota Hare is a 56 y.o. female patient who presented with shortness of breath, weakness, nausea, vomiting.  The patient has history of obesity, lymphedema, obstructive sleep apnea.The patient states that she felt like her blood pressure was high approximately 10 days ago.  She was seen at urgent care where she was diagnosed with COVID-19.  She was started on 10 mg of lisinopril at that time.  The patient states since then she has had nausea, vomiting, diarrhea.  She reports decreased oral intake.  She reports increasing shortness of breath with cough.  She reports generalized weakness.  For this reason she presented to the emergency department.  On arrival to the emergency department the patient was febrile at 100.5 °F.  She was mildly hypoxic and placed on 2 L nasal cannula.  Blood work showed no abnormalities.  Lactate was normal.  Chest x-ray shows a right upper lobe pneumonia.  She was started on Rocephin, azithromycin and the hospital service was asked to admit the patient for further evaluation and management.  Patient was  treated with IV steroids, bronchodilators, IV antibiotics and feels significantly better.  Patient possibly required 2 L of oxygen in the ER however has not required any oxygen during her hospitalization here.  CT was consistent with Covid pneumonia.  Patient is tolerating a diet.  No nausea vomiting abdominal pain.  Patient will be discharged home today in stable condition.  At this time patient does not require any steroids or antibiotics given she is not on oxygen and procalcitonin is in normal limits.  Patient did request refill on her albuterol nebulizer medication which I did.  Patient discharged home in stable condition.  Discussed with patient that it is okay to stay off work until next week to allow her to continue to recover.  Patient is over 2 weeks out from her initial diagnosis.  Patient will be discharged home today in stable condition.  Patient was told to follow-up with her primary care provider in 1 week.             Day of Discharge     Vital Signs:  Temp:  [97.2 °F (36.2 °C)-98.2 °F (36.8 °C)] 97.3 °F (36.3 °C)  Heart Rate:  [52-81] 81  Resp:  [18-20] 18  BP: (128-156)/(69-95) 128/75  Flow (L/min):  [0] 0  Physical Exam:               Constitutional: Awake, alert, no acute distress.  Sitting in bed              Eyes: Pupils equal, sclerae anicteric, no conjunctival injection              HENT: NCAT, mucous membranes moist              Neck: Supple              Respiratory: Clear to auscultation bilaterally, nonlabored respirations.  No wheezing rales or rhonchi              Cardiovascular: RRR, no murmurs, rubs, or gallops, palpable pedal pulses bilaterally              Gastrointestinal: Positive bowel sounds, soft, nontender, nondistended              Musculoskeletal: No bilateral ankle edema, no clubbing or cyanosis to extremities              Psychiatric: Appropriate affect, cooperative              Neurologic: Oriented x 3, strength symmetric in all extremities, Cranial Nerves grossly intact  to confrontation, speech clear              Skin: No rashes       Discharge Details        Discharge Medications      New Medications      Instructions Start Date   albuterol 0.63 MG/3ML nebulizer solution  Commonly known as: ACCUNEB   0.63 mg, Nebulization, Every 6 Hours PRN         Continue These Medications      Instructions Start Date   ascorbic acid 250 MG tablet  Commonly known as: VITAMIN C   250 mg, Oral, Daily      FISH OIL ULTRA PO   1,000 mg, Oral, Daily      lisinopril 10 MG tablet  Commonly known as: PRINIVIL,ZESTRIL   10 mg, Oral, Daily      multivitamin tablet tablet  Commonly known as: THERAGRAN   Oral      promethazine-dextromethorphan 6.25-15 MG/5ML syrup  Commonly known as: PROMETHAZINE-DM   5 mL, Oral, 2 times daily      VITAMIN B-12 PO   Oral      vitamin D 1.25 MG (50057 UT) capsule capsule  Commonly known as: ERGOCALCIFEROL   50,000 Units, Oral, Weekly      vitamin E 1000 UNIT capsule   1,000 Units, Oral, Daily             Allergies   Allergen Reactions   • Codeine Unknown (See Comments)     She states she gets hyper   • Tramadol Unknown (See Comments)     Severe depression when coming off of it   • Demerol [Meperidine] Rash     Headache also   • Hydrocodone-Acetaminophen Rash   • Percocet [Oxycodone-Acetaminophen] Rash   • Tylenol [Acetaminophen] Rash     Pt stated  Headache and rash       Discharge Disposition:  Home or Self Care    Diet:  Hospital:  Diet Order   Procedures   • Diet Regular       Discharge Activity:       CODE STATUS:  Code Status and Medical Interventions:   Ordered at: 08/13/21 1238     Level Of Support Discussed With:    Patient     Code Status:    CPR     Medical Interventions (Level of Support Prior to Arrest):    Full         No future appointments.    Additional Instructions for the Follow-ups that You Need to Schedule     Discharge Follow-up with PCP   As directed       Currently Documented PCP:    Ruchi Bradford, APRN    PCP Phone Number:    [phone]      Follow Up Details: in 1 week               Pertinent  and/or Most Recent Results     PROCEDURES:   None     LAB RESULTS:      Lab 08/14/21  0000 08/13/21  1646 08/13/21  1645   WBC  --   --  5.83   HEMOGLOBIN  --   --  15.5   HEMATOCRIT  --   --  47.4*   PLATELETS  --   --  125*   NEUTROS ABS  --   --  3.99   IMMATURE GRANS (ABS)  --   --  0.02   LYMPHS ABS  --   --  1.45   MONOS ABS  --   --  0.34   EOS ABS  --   --  0.01   MCV  --   --  84.5   CRP 2.99*  --   --    PROCALCITONIN 0.10  --   --    LACTATE  --  1.7  --    *  --   --          Lab 08/13/21  1644   SODIUM 139   POTASSIUM 3.6   CHLORIDE 103   CO2 22.3   ANION GAP 13.7   BUN 22*   CREATININE 0.96   GLUCOSE 95   CALCIUM 8.8         Lab 08/13/21  1644   TOTAL PROTEIN 6.9   ALBUMIN 3.80   GLOBULIN 3.1   ALT (SGPT) 40*   AST (SGOT) 32   BILIRUBIN 0.6   ALK PHOS 120*         Lab 08/13/21  1644   PROBNP 6.6   TROPONIN T <0.010             Lab 08/14/21  0000   FERRITIN 401.60*         Brief Urine Lab Results     None        Microbiology Results (last 10 days)     Procedure Component Value - Date/Time    Blood Culture - Blood, Arm, Left [[id number]] Collected: 08/13/21 1701    Lab Status: Preliminary result Specimen: Blood from Arm, Left Updated: 08/15/21 1715     Blood Culture No growth at 2 days    Blood Culture - Blood, Arm, Left [[id number]] Collected: 08/13/21 1656    Lab Status: Preliminary result Specimen: Blood from Arm, Left Updated: 08/15/21 1715     Blood Culture No growth at 2 days               Results for orders placed during the hospital encounter of 04/18/18    Duplex Venous Lower Extremity - Left CAR    Interpretation Summary  · Normal left lower extremity venous duplex scan.      Results for orders placed during the hospital encounter of 04/18/18    Duplex Venous Lower Extremity - Left CAR    Interpretation Summary  · Normal left lower extremity venous duplex scan.          Labs Pending at Discharge:  Pending Labs     Order Current Status     Blood Culture - Blood, Arm, Left Preliminary result    Blood Culture - Blood, Arm, Left Preliminary result            Time spent on Discharge including face to face service:  More than 35  minutes    Electronically signed by Reginaldo Saldivar DO, 08/16/21, 1:52 PM EDT.

## 2021-08-16 NOTE — PLAN OF CARE
Goal Outcome Evaluation:              Outcome Summary: Oxygen sats maintained on room, no complaints from patient. VSS

## 2021-08-17 ENCOUNTER — TRANSITIONAL CARE MANAGEMENT TELEPHONE ENCOUNTER (OUTPATIENT)
Dept: CALL CENTER | Facility: HOSPITAL | Age: 56
End: 2021-08-17

## 2021-08-17 NOTE — OUTREACH NOTE
Call Center TCM Note      Responses   [facility] patient discharged from?  [facility]   Does the patient have one of the following disease processes/diagnoses(primary or secondary)?  COVID-19   COVID-19 underlying condition?  None   TCM attempt successful?  Yes   Call start time  1255   Call end time  1303   Discharge diagnosis  COVID-19 virus infection Right upper lobe pneumonia Acute respiratory failure with hypoxia    Meds reviewed with patient/caregiver?  Yes   Is the patient having any side effects they believe may be caused by any medication additions or changes?  No   Does the patient have all medications ordered at discharge?  Yes   Is the patient taking all medications as directed (includes completed medication regime)?  Yes   Medication comments  Using nebs as ordered   Does the patient have a primary care provider?   Yes   Comments regarding PCP  Pt declines to schedule an appt at this time r/t return to work timeline--will route to office for a call later in the week   Does the patient have an appointment with their PCP or specialist within 7 days of discharge?  No   Has the patient kept scheduled appointments due by today?  N/A   Has home health visited the patient within 72 hours of discharge?  N/A   Did the patient receive a copy of their discharge instructions?  Yes   Did the patient receive a copy of COVID-19 specific instructions?  Yes   Nursing interventions  Reviewed instructions with patient   What is the patient's perception of their health status since discharge?  Improving   Does the patient have any of the following symptoms?  Cough [Clear productive cough with occasional white sputum noted]   Nursing Interventions  Nurse provided patient education [Dis'd s/s of worsening condition which require MD notification such as increased SOA, change in amount/color of mucus, chest pain, fever, increased cough-v/u.]   Pulse Ox monitoring  Intermittent   Pulse Ox device source  Patient   O2 Sat  comments  96-97% on room air   O2 Sat: education provided  Sat levels   Is the patient/caregiver able to teach back steps to recovery at home?  Set small, achievable goals for return to baseline health, Rest and rebuild strength, gradually increase activity [Encouraged to continue to use flutter device]   If the patient is a current smoker, are they able to teach back resources for cessation?  Not a smoker   Is the patient/caregiver able to teach back the hierarchy of who to call/visit for symptoms/problems? PCP, Specialist, Home health nurse, Urgent Care, ED, 911  Yes   TCM call completed?  Yes          Nia Villagomez, RN    8/17/2021, 13:03 EDT

## 2021-08-18 ENCOUNTER — READMISSION MANAGEMENT (OUTPATIENT)
Dept: CALL CENTER | Facility: HOSPITAL | Age: 56
End: 2021-08-18

## 2021-08-18 LAB
BACTERIA SPEC AEROBE CULT: NORMAL
BACTERIA SPEC AEROBE CULT: NORMAL

## 2021-08-18 NOTE — OUTREACH NOTE
COVID-19 Week 1 Survey      Responses   [facility] patient discharged from?  [facility]   Does the patient have one of the following disease processes/diagnoses(primary or secondary)?  COVID-19   COVID-19 underlying condition?  None   Call Number  Call 2   Week 1 Call successful?  No   Discharge diagnosis  COVID-19 virus infection Right upper lobe pneumonia Acute respiratory failure with hypoxia           Meaghan Covarrubias RN

## 2021-08-19 ENCOUNTER — READMISSION MANAGEMENT (OUTPATIENT)
Dept: CALL CENTER | Facility: HOSPITAL | Age: 56
End: 2021-08-19

## 2021-08-19 NOTE — TELEPHONE ENCOUNTER
Patient still feeling fatigue, she did  her cough syrup.  Patient transferred to  to [facility] hospital f/u appt via video.

## 2021-08-19 NOTE — OUTREACH NOTE
COVID-19 Week 1 Survey      Responses   [facility] patient discharged from?  [facility]   Does the patient have one of the following disease processes/diagnoses(primary or secondary)?  COVID-19   COVID-19 underlying condition?  None   Call Number  Call 3   Week 1 Call successful?  No   Discharge diagnosis  COVID-19 virus infection Right upper lobe pneumonia Acute respiratory failure with hypoxia           Lester Larkin RN

## 2021-08-24 ENCOUNTER — OFFICE VISIT (OUTPATIENT)
Dept: FAMILY MEDICINE CLINIC | Facility: CLINIC | Age: 56
End: 2021-08-24

## 2021-08-24 ENCOUNTER — READMISSION MANAGEMENT (OUTPATIENT)
Dept: CALL CENTER | Facility: HOSPITAL | Age: 56
End: 2021-08-24

## 2021-08-24 DIAGNOSIS — R53.83 FATIGUE, UNSPECIFIED TYPE: ICD-10-CM

## 2021-08-24 DIAGNOSIS — R05.9 COUGH: ICD-10-CM

## 2021-08-24 DIAGNOSIS — Z09 HOSPITAL DISCHARGE FOLLOW-UP: Primary | ICD-10-CM

## 2021-08-24 DIAGNOSIS — U07.1 COVID-19: ICD-10-CM

## 2021-08-24 DIAGNOSIS — I10 HYPERTENSION, UNSPECIFIED TYPE: ICD-10-CM

## 2021-08-24 PROCEDURE — 99495 TRANSJ CARE MGMT MOD F2F 14D: CPT | Performed by: NURSE PRACTITIONER

## 2021-08-24 RX ORDER — BENZONATATE 100 MG/1
100 CAPSULE ORAL 3 TIMES DAILY PRN
Qty: 90 CAPSULE | Refills: 0 | OUTPATIENT
Start: 2021-08-24 | End: 2022-05-09

## 2021-08-24 RX ORDER — LISINOPRIL 10 MG/1
10 TABLET ORAL DAILY
Qty: 30 TABLET | Refills: 2 | OUTPATIENT
Start: 2021-08-24 | End: 2022-05-09

## 2021-08-24 NOTE — PROGRESS NOTES
Transitional Care Follow Up Visit  Subjective    You have chosen to receive care through a telehealth visit.  Do you consent to use a video/audio connection for your medical care today? Yes  Visit completed via FaceTime per patient request.    Carlota Hare is a 56 y.o. female who presents for a transitional care management visit.    Within 48 business hours after discharge our office contacted her via telephone to coordinate her care and needs.      I reviewed and discussed the details of that call along with the discharge summary, hospital problems, inpatient lab results, inpatient diagnostic studies, and consultation reports with Carlota.     Current outpatient and discharge medications have been reconciled for the patient.  Reviewed by: ARVIND Dolan    Patient states she is feeling a little better but continues to have cough, excessive fatigue, SOA with exertion or talking.  Patient states Promethazine cough syrup is not helping.  Patient's biggest complaint is the fatigue.    Patient has not been vaccinated against Covid 19.    Date of TCM Phone Call 8/16/2021   [facility]   Date of Admission 8/13/2021   Date of Discharge 8/16/2021   Discharge Disposition Home or Self Care     Risk for Readmission (LACE) Score: 6 (8/16/2021  6:01 AM)      History of Present Illness   Course During Hospital Stay:      Pt diagnosed with Covid on 8/3 and did not get any better and went to ER on 8/13. Pt admitted on 8/13 and discharged on 8/16 with Covid pneumonia. Pt was not intubated, but was on supplemental oxygen in the hospital. She was not discharged on any home O2. She does have a pulse oximeter at home and states her oxygen saturation has been running 95-96%.    HTN-pt recently diagnosed with high b/p at the urgent care visit on 8/3. She was prescribed lisinopril 10mg daily. She states she is tolerating the medication well. She states as long as she remembers to take the medication it is running in the 150s.       The following portions of the patient's history were reviewed and updated as appropriate: allergies, current medications, past family history, past medical history, past social history, past surgical history and problem list.    Review of Systems   Constitutional: Positive for fatigue.   Respiratory: Positive for cough.        Objective   Physical Exam  Constitutional:       Appearance: Normal appearance.   Pulmonary:      Effort: Pulmonary effort is normal.   Neurological:      General: No focal deficit present.      Mental Status: She is alert and oriented to person, place, and time.   Psychiatric:         Mood and Affect: Mood normal.         Behavior: Behavior normal.         Assessment/Plan   Problems Addressed this Visit     None      Visit Diagnoses     Hospital discharge follow-up    -  Primary    Cough        Relevant Medications    benzonatate (Tessalon Perles) 100 MG capsule    Fatigue, unspecified type        COVID-19        Hypertension, unspecified type        Continue lisinopril, advised close monitoring of BP.  Follow-up in 6 weeks with blood pressure log.    Relevant Medications    lisinopril (PRINIVIL,ZESTRIL) 10 MG tablet      Diagnoses       Codes Comments    Hospital discharge follow-up    -  Primary ICD-10-CM: Z09  ICD-9-CM: V67.59     Cough     ICD-10-CM: R05  ICD-9-CM: 786.2     Fatigue, unspecified type     ICD-10-CM: R53.83  ICD-9-CM: 780.79     COVID-19     ICD-10-CM: U07.1  ICD-9-CM: 079.89     Hypertension, unspecified type     ICD-10-CM: I10  ICD-9-CM: 401.9 Continue lisinopril, advised close monitoring of BP.  Follow-up in 6 weeks with blood pressure log.

## 2021-08-24 NOTE — OUTREACH NOTE
COVID-19 Week 2 Survey      Responses   [facility] [place] patient discharged from?  [facility]   Does the patient have one of the following disease processes/diagnoses(primary or secondary)?  COVID-19   COVID-19 underlying condition?  None   Call Number  Call 1   COVID-19 Week 2: Call 1 attempt successful?  No   Discharge diagnosis  COVID-19 virus infection Right upper lobe pneumonia Acute respiratory failure with hypoxia           Lester Larkin RN

## 2023-02-07 ENCOUNTER — OFFICE VISIT (OUTPATIENT)
Dept: SURGERY | Facility: CLINIC | Age: 58
End: 2023-02-07
Payer: COMMERCIAL

## 2023-02-07 ENCOUNTER — PREP FOR SURGERY (OUTPATIENT)
Dept: OTHER | Facility: HOSPITAL | Age: 58
End: 2023-02-07
Payer: COMMERCIAL

## 2023-02-07 VITALS — HEART RATE: 76 BPM | WEIGHT: 257.6 LBS | HEIGHT: 66 IN | OXYGEN SATURATION: 97 % | BODY MASS INDEX: 41.4 KG/M2

## 2023-02-07 DIAGNOSIS — R10.30 LOWER ABDOMINAL PAIN: ICD-10-CM

## 2023-02-07 DIAGNOSIS — R10.30 LOWER ABDOMINAL PAIN: Primary | ICD-10-CM

## 2023-02-07 DIAGNOSIS — R19.7 DIARRHEA, UNSPECIFIED TYPE: ICD-10-CM

## 2023-02-07 DIAGNOSIS — R19.7 DIARRHEA, UNSPECIFIED TYPE: Primary | ICD-10-CM

## 2023-02-07 PROCEDURE — 99213 OFFICE O/P EST LOW 20 MIN: CPT | Performed by: NURSE PRACTITIONER

## 2023-02-07 RX ORDER — TOPIRAMATE 25 MG/1
1 CAPSULE, COATED PELLETS ORAL DAILY
COMMUNITY
Start: 2023-01-26

## 2023-02-07 RX ORDER — LOSARTAN POTASSIUM 50 MG/1
TABLET ORAL
COMMUNITY
Start: 2023-01-09

## 2023-02-07 RX ORDER — LANOLIN ALCOHOL/MO/W.PET/CERES
CREAM (GRAM) TOPICAL
COMMUNITY

## 2023-02-07 RX ORDER — SOD SULF/POT CHLORIDE/MAG SULF 1.479 G
24 TABLET ORAL ONCE
Qty: 24 TABLET | Refills: 0 | Status: SHIPPED | OUTPATIENT
Start: 2023-02-07 | End: 2023-02-07

## 2023-02-07 RX ORDER — LOSARTAN POTASSIUM 25 MG/1
1 TABLET ORAL DAILY
COMMUNITY
Start: 2023-01-04

## 2023-02-07 NOTE — PROGRESS NOTES
Chief Complaint: Colonoscopy (DIARRHEA AND CONSTIPATION)    Subjective      I am having lower abdominal pain and diarrhea       History of Present Illness  Carlota Hare is a 57 y.o. female presents to [facility] for colonoscopy consult.    Patient presents today on referral from Kenyatta Cullen for colonoscopy consultation.    Patient reports that she is with lower abdominal pain after bowel movements.  She admits to diarrhea x4 years.  She reports if she eats Gummies or nuts it can cause constipation issues.  She reports that when she is having diarrhea she will eat some of these to, control the diarrhea.  She does have a history of a laparoscopic cholecystectomy.  She denies any rectal bleeding.    Denies any family history of colorectal cancer.    Patient reports that she has RICHARD and does not use her CPAP.    : Colonoscopy ([facility]): transverse - tubular adenoma; hemorrhoids.    : Colonoscopy ([facility]); Ascending - tubular adenoma.    8/10: Hemorrhoidectomy (endy): Thrombosed hemorrhoids.     8/10: Colonoscopy ([facility]): external hemorrhoids, otherwise normal colon.       Objective     Past Medical History:   Diagnosis Date   • Anemia    • Anxiety    • CPAP (continuous positive airway pressure) dependence    • H/O Lymphedema     Left arm and leg   • History of anemia    • History of foreign travel     [place] and [place]   • History of hypoglycemia    • History of vitamin D deficiency    • OAB (overactive bladder)    • RICHARD (obstructive sleep apnea)    • Poor vision        Past Surgical History:   Procedure Laterality Date   •  SECTION     • CHOLECYSTECTOMY     • DILATATION AND CURETTAGE     • HEMORRHOIDECTOMY     • LYMPH NODE DISSECTION     • NECK SURGERY     • ROTATOR CUFF REPAIR Right    • SHOULDER SURGERY Left 2018       Outpatient Medications Marked as Taking for the 23 encounter (Office Visit) with Lupis Chirinos APRN   Medication  Sig Dispense Refill   • ascorbic acid (VITAMIN C) 250 MG tablet Take 250 mg by mouth Daily.     • Bioflavonoid Products (Vitamin C) chewable tablet Chew.     • Biotin 300 MCG tablet Take  by mouth.     • Cholecalciferol 1.25 MG (39141 UT) tablet Take  by mouth.     • Cyanocobalamin (VITAMIN B-12 PO) Take  by mouth.     • Cyanocobalamin 5000 MCG capsule Take  by mouth.     • ELDERBERRY PO Take  by mouth.     • Glucosamine-Chondroit-Vit C-Mn (GLUCOSAMINE 1500 COMPLEX PO) Take 1,500 mg by mouth.     • losartan (COZAAR) 25 MG tablet Take 1 tablet by mouth Daily.     • losartan (COZAAR) 50 MG tablet TAKE 1 & 1/2 TABLET BY MOUTH EVERY DAY     • Multiple Vitamin (MULTI-VITAMIN DAILY PO) Take  by mouth.     • topiramate (TOPAMAX) 25 MG capsule (sprinkle) Take 1 capsule by mouth Daily.     • vitamin E 100 UNIT capsule Take 100 Units by mouth Daily.         Allergies   Allergen Reactions   • Codeine Other (See Comments)     She states she gets hyper   • Demerol [Meperidine] Rash     Headache also   • Hydrocodone-Acetaminophen Rash   • Percocet [Oxycodone-Acetaminophen] Rash   • Tramadol Unknown (See Comments)     Severe depression when coming off of it   • Tylenol [Acetaminophen] Rash     Pt stated  Headache and rash        Family History   Problem Relation Age of Onset   • Diabetes Mother    • Skin cancer Mother    • Diabetes Maternal Aunt    • Breast cancer Maternal Aunt    • Diabetes Other    • Hyperlipidemia Other    • Hypertension Other    • Lung cancer Other    • Clotting disorder Father        Social History     Socioeconomic History   • Marital status:      Spouse name: Eddie   • Number of children: 1   • Years of education: College   Tobacco Use   • Smoking status: Never   • Smokeless tobacco: Never   Vaping Use   • Vaping Use: Never used   Substance and Sexual Activity   • Alcohol use: No   • Drug use: No   • Sexual activity: Defer       Review of Systems   Constitutional: Negative for chills and fever.  "  HENT: Negative for trouble swallowing.    Gastrointestinal: Positive for abdominal pain, constipation and diarrhea. Negative for abdominal distention, anal bleeding, blood in stool, nausea, rectal pain, vomiting, GERD and indigestion.        Vital Signs:   Pulse 76   Ht 167.6 cm (66\")   Wt 117 kg (257 lb 9.6 oz)   SpO2 97%   BMI 41.58 kg/m²      Physical Exam  Vitals and nursing note reviewed.   Constitutional:       General: She is not in acute distress.     Appearance: Normal appearance.   HENT:      Head: Normocephalic.   Cardiovascular:      Rate and Rhythm: Normal rate.   Pulmonary:      Effort: Pulmonary effort is normal.      Breath sounds: No stridor.   Abdominal:      Palpations: Abdomen is soft.      Tenderness: There is no guarding.   Musculoskeletal:         General: No deformity. Normal range of motion.   Skin:     General: Skin is warm and dry.      Coloration: Skin is not jaundiced.   Neurological:      General: No focal deficit present.      Mental Status: She is alert and oriented to person, place, and time.   Psychiatric:         Mood and Affect: Mood normal.         Thought Content: Thought content normal.          Result Review :          []  Laboratory  []  Radiology  []  Pathology  []  Microbiology  []  EKG/Telemetry   []  Cardiology/Vascular   []  Old records  I spent 30 minutes caring for Carlota on this date of service. This time includes time spent by me in the following activities: reviewing tests, obtaining and/or reviewing a separately obtained history, performing a medically appropriate examination and/or evaluation, ordering medications, tests, or procedures, and documenting information in the medical record.       Assessment and Plan    Diagnoses and all orders for this visit:    1. Diarrhea, unspecified type (Primary)    2. Lower abdominal pain    Other orders  -     Sodium Sulfate-Mag Sulfate-KCl (Sutab) [id number] MG tablet; Take 24 tablets by mouth 1 (One) Time for 1 dose. " Take as directed. Directions given in office  Dispense: 24 tablet; Refill: 0        Follow Up   Return for Schedule colonoscopy with Dr. Amor on 5/17/2023 biopsies [facility].     Phone PAT.  [facility] will call arrival time.    Possible risks/complications, benefits, and alternatives to surgical or invasive procedure have been explained to patient and/or legal guardian.     Patient has been evaluated and can tolerate anesthesia and/or sedation. Risks, benefits, and alternatives to anesthesia and sedation have been explained to patient and/or legal guardian.  Patient verbalizes understanding and is willing to proceed with above plan.    Patient was given instructions and counseling regarding her condition or for health maintenance advice. Please see specific information pulled into the AVS if appropriate.     The office note was dictated with the help of the dragon dictation system.

## 2023-02-08 ENCOUNTER — TRANSCRIBE ORDERS (OUTPATIENT)
Dept: ADMINISTRATIVE | Facility: HOSPITAL | Age: 58
End: 2023-02-08
Payer: COMMERCIAL

## 2023-02-08 DIAGNOSIS — Z12.31 SCREENING MAMMOGRAM FOR HIGH-RISK PATIENT: Primary | ICD-10-CM

## 2023-04-26 ENCOUNTER — HOSPITAL ENCOUNTER (OUTPATIENT)
Dept: MAMMOGRAPHY | Facility: HOSPITAL | Age: 58
Discharge: HOME | End: 2023-04-26
Admitting: NURSE PRACTITIONER
Payer: COMMERCIAL

## 2023-04-26 DIAGNOSIS — Z12.31 SCREENING MAMMOGRAM FOR HIGH-RISK PATIENT: ICD-10-CM

## 2023-04-26 PROCEDURE — 77067 SCR MAMMO BI INCL CAD: CPT

## 2023-04-26 PROCEDURE — 77063 BREAST TOMOSYNTHESIS BI: CPT

## 2023-05-11 NOTE — PRE-PROCEDURE INSTRUCTIONS
Arrival time of 0730    Must have a  over the age of 18 for transportation home post- procedure.    Education provided on laxative administration; bowel prep to be taken in two doses. Reviewed clear liquid diet for day prior to procedure.     Instructed patient to not take any medication the morning of their procedure and to bring a list of medications to the hospital.     Instructed to take any nebulizer treatments prior to coming the morning of procedure.     Patient verbalized understanding of instructions.

## 2023-05-15 ENCOUNTER — PREP FOR SURGERY (OUTPATIENT)
Dept: OTHER | Facility: HOSPITAL | Age: 58
End: 2023-05-15
Payer: COMMERCIAL

## 2023-05-16 NOTE — H&P
Chief Complaint: Colonoscopy (DIARRHEA AND CONSTIPATION)    Subjective      I am having lower abdominal pain and diarrhea       History of Present Illness  Carlota Hare is a 57 y.o. female presents to [facility] for colonoscopy consult.    Patient presents today on referral from Kenyatta Cullen for colonoscopy consultation.    Patient reports that she is with lower abdominal pain after bowel movements.  She admits to diarrhea x4 years.  She reports if she eats Gummies or nuts it can cause constipation issues.  She reports that when she is having diarrhea she will eat some of these to, control the diarrhea.  She does have a history of a laparoscopic cholecystectomy.  She denies any rectal bleeding.    SUTAB prep used    Denies any family history of colorectal cancer.    Patient reports that she has RICHARD and does not use her CPAP.    : Colonoscopy (Kaveh): transverse - tubular adenoma; hemorrhoids.    : Colonoscopy (Kaveh); Ascending - tubular adenoma.    8/10: Hemorrhoidectomy (endy): Thrombosed hemorrhoids.     8/10: Colonoscopy (Kaveh): external hemorrhoids, otherwise normal colon.       Objective     Past Medical History:   Diagnosis Date   • Anemia    • Anxiety    • CPAP (continuous positive airway pressure) dependence    • H/O Lymphedema     Left arm and leg   • History of anemia    • History of foreign travel     [place] and [place]   • History of hypoglycemia    • History of vitamin D deficiency    • OAB (overactive bladder)    • RICHARD (obstructive sleep apnea)    • Poor vision        Past Surgical History:   Procedure Laterality Date   •  SECTION     • CHOLECYSTECTOMY     • DILATATION AND CURETTAGE     • HEMORRHOIDECTOMY     • LYMPH NODE DISSECTION     • NECK SURGERY     • ROTATOR CUFF REPAIR Right    • SHOULDER SURGERY Left 2018       Outpatient Medications Marked as Taking for the 23 encounter (Office Visit) with Lupis Chirinos,  "APRN   Medication Sig Dispense Refill   • ascorbic acid (VITAMIN C) 250 MG tablet Take 250 mg by mouth Daily.     • Bioflavonoid Products (Vitamin C) chewable tablet Chew.     • Biotin 300 MCG tablet Take  by mouth.     • Cholecalciferol 1.25 MG (27967 UT) tablet Take  by mouth.     • Cyanocobalamin (VITAMIN B-12 PO) Take  by mouth.     • Cyanocobalamin 5000 MCG capsule Take  by mouth.     • ELDERBERRY PO Take  by mouth.     • Glucosamine-Chondroit-Vit C-Mn (GLUCOSAMINE 1500 COMPLEX PO) Take 1,500 mg by mouth.     • losartan (COZAAR) 25 MG tablet Take 1 tablet by mouth Daily.     • losartan (COZAAR) 50 MG tablet TAKE 1 & 1/2 TABLET BY MOUTH EVERY DAY     • Multiple Vitamin (MULTI-VITAMIN DAILY PO) Take  by mouth.     • topiramate (TOPAMAX) 25 MG capsule (sprinkle) Take 1 capsule by mouth Daily.     • vitamin E 100 UNIT capsule Take 100 Units by mouth Daily.         Allergies   Allergen Reactions   • Codeine Other (See Comments)     She states she gets hyper   • Demerol [Meperidine] Rash     Headache also   • Hydrocodone-Acetaminophen Rash   • Percocet [Oxycodone-Acetaminophen] Rash   • Tramadol Unknown (See Comments)     Severe depression when coming off of it   • Tylenol [Acetaminophen] Rash     Pt stated  Headache and rash        Family History   Problem Relation Age of Onset   • Diabetes Mother    • Skin cancer Mother    • Diabetes Maternal Aunt    • Breast cancer Maternal Aunt    • Diabetes Other    • Hyperlipidemia Other    • Hypertension Other    • Lung cancer Other    • Clotting disorder Father        Social History     Socioeconomic History   • Marital status:      Spouse name: Eddie   • Number of children: 1   • Years of education: College   Tobacco Use   • Smoking status: Never   • Smokeless tobacco: Never   Vaping Use   • Vaping Use: Never used   Substance and Sexual Activity   • Alcohol use: No   • Drug use: No   • Sexual activity: Defer       Vital Signs:   Pulse 76   Ht 167.6 cm (66\")   Wt 117 " kg (257 lb 9.6 oz)   SpO2 97%   BMI 41.58 kg/m²      Physical Exam  Vitals and nursing note reviewed.   Constitutional:       General: She is not in acute distress.     Appearance: Normal appearance.   Cardiovascular:      Rate and Rhythm: Normal rate.   Pulmonary:      Effort: Pulmonary effort is normal.   Skin:     General: Skin is warm and dry.   Neurological:      General: No focal deficit present.   Psychiatric:         Mood and Affect: Mood normal.           Assessment   1. Diarrhea, unspecified type  2. Lower abdominal pain    Plan  Colonoscopy    Risks and benefits discussed    Leobardo Hodgson M.D.  05/16/23    Electronically signed by Leobardo Hodgson MD, 05/16/23, 3:54 PM EDT.

## 2023-05-17 ENCOUNTER — ANESTHESIA EVENT (OUTPATIENT)
Dept: GASTROENTEROLOGY | Facility: HOSPITAL | Age: 58
End: 2023-05-17
Payer: COMMERCIAL

## 2023-05-17 ENCOUNTER — ANESTHESIA (OUTPATIENT)
Dept: GASTROENTEROLOGY | Facility: HOSPITAL | Age: 58
End: 2023-05-17
Payer: COMMERCIAL

## 2023-05-17 ENCOUNTER — HOSPITAL ENCOUNTER (OUTPATIENT)
Facility: HOSPITAL | Age: 58
Setting detail: HOSPITAL OUTPATIENT SURGERY
Discharge: HOME | End: 2023-05-17
Attending: SURGERY | Admitting: SURGERY
Payer: COMMERCIAL

## 2023-05-17 VITALS
BODY MASS INDEX: 40.92 KG/M2 | DIASTOLIC BLOOD PRESSURE: 90 MMHG | HEART RATE: 75 BPM | SYSTOLIC BLOOD PRESSURE: 141 MMHG | TEMPERATURE: 97.4 F | RESPIRATION RATE: 14 BRPM | WEIGHT: 253.53 LBS | OXYGEN SATURATION: 99 %

## 2023-05-17 DIAGNOSIS — R19.7 DIARRHEA, UNSPECIFIED TYPE: ICD-10-CM

## 2023-05-17 DIAGNOSIS — R10.30 LOWER ABDOMINAL PAIN: ICD-10-CM

## 2023-05-17 LAB
027 TOXIN: NORMAL
C DIFF TOX GENS STL QL NAA+PROBE: NEGATIVE

## 2023-05-17 PROCEDURE — 25010000002 PROPOFOL 10 MG/ML EMULSION: Performed by: NURSE ANESTHETIST, CERTIFIED REGISTERED

## 2023-05-17 PROCEDURE — 87493 C DIFF AMPLIFIED PROBE: CPT | Performed by: SURGERY

## 2023-05-17 PROCEDURE — 87506 IADNA-DNA/RNA PROBE TQ 6-11: CPT | Performed by: SURGERY

## 2023-05-17 RX ORDER — LIDOCAINE HYDROCHLORIDE 20 MG/ML
INJECTION, SOLUTION EPIDURAL; INFILTRATION; INTRACAUDAL; PERINEURAL AS NEEDED
Status: DISCONTINUED | OUTPATIENT
Start: 2023-05-17 | End: 2023-05-17 | Stop reason: SURG

## 2023-05-17 RX ORDER — SODIUM CHLORIDE, SODIUM LACTATE, POTASSIUM CHLORIDE, CALCIUM CHLORIDE 600; 310; 30; 20 MG/100ML; MG/100ML; MG/100ML; MG/100ML
30 INJECTION, SOLUTION INTRAVENOUS CONTINUOUS
Status: DISCONTINUED | OUTPATIENT
Start: 2023-05-17 | End: 2023-05-17 | Stop reason: HOSPADM

## 2023-05-17 RX ADMIN — LIDOCAINE HYDROCHLORIDE 40 MG: 20 INJECTION, SOLUTION EPIDURAL; INFILTRATION; INTRACAUDAL; PERINEURAL at 09:13

## 2023-05-17 RX ADMIN — SODIUM CHLORIDE, POTASSIUM CHLORIDE, SODIUM LACTATE AND CALCIUM CHLORIDE 30 ML/HR: 600; 310; 30; 20 INJECTION, SOLUTION INTRAVENOUS at 08:18

## 2023-05-17 RX ADMIN — PROPOFOL 250 MCG/KG/MIN: 10 INJECTION, EMULSION INTRAVENOUS at 09:13

## 2023-05-17 NOTE — ANESTHESIA PREPROCEDURE EVALUATION
Anesthesia Evaluation     Patient summary reviewed and Nursing notes reviewed                Airway   Mallampati: I  TM distance: >3 FB  Neck ROM: full  No difficulty expected  Dental      Pulmonary - normal exam    breath sounds clear to auscultation  (+) shortness of breath, sleep apnea,   Cardiovascular - negative cardio ROS and normal exam    Rhythm: regular  Rate: normal        Neuro/Psych  (+) psychiatric history,    GI/Hepatic/Renal/Endo    (+) morbid obesity,      Musculoskeletal (-) negative ROS    Abdominal    Substance History - negative use     OB/GYN negative ob/gyn ROS         Other                        Anesthesia Plan    ASA 3     general     intravenous induction     Anesthetic plan, risks, benefits, and alternatives have been provided, discussed and informed consent has been obtained with: patient.        CODE STATUS:

## 2023-05-17 NOTE — ANESTHESIA POSTPROCEDURE EVALUATION
Patient: Carlota Hare    Procedure Summary     Date: 05/17/23 Room / Location: [facility] 5 / [facility]    Anesthesia Start: 0911 Anesthesia Stop: 0932    Procedure: COLONOSCOPY with stool collection Diagnosis:       Lower abdominal pain      Diarrhea, unspecified type      (Lower abdominal pain [R10.30])      (Diarrhea, unspecified type [R19.7])    Surgeons: Leobardo Hodgson MD Provider: Isaías Bonilla MD    Anesthesia Type: general ASA Status: 3          Anesthesia Type: general    Vitals  Vitals Value Taken Time   /90 05/17/23 0951   Temp 36.3 °C (97.4 °F) 05/17/23 0930   Pulse 75 05/17/23 0951   Resp 14 05/17/23 0950   SpO2 100 % 05/17/23 0951   Vitals shown include unvalidated device data.        Post Anesthesia Care and Evaluation    Patient location during evaluation: bedside  Patient participation: complete - patient participated  Level of consciousness: awake  Pain management: adequate    Airway patency: patent  PONV Status: none  Cardiovascular status: acceptable  Respiratory status: acceptable  Hydration status: acceptable    Comments: An Anesthesiologist personally participated in the most demanding procedures (including induction and emergence if applicable) in the anesthesia plan, monitored the course of anesthesia administration at frequent intervals and remained physically present and available for immediate diagnosis and treatment of emergencies.

## 2023-05-18 LAB
C COLI+JEJ+UPSA DNA STL QL NAA+NON-PROBE: NOT DETECTED
CRYPTOSP DNA STL QL NAA+NON-PROBE: NOT DETECTED
E HISTOLYT DNA STL QL NAA+NON-PROBE: NOT DETECTED
EC STX1+STX2 GENES STL QL NAA+NON-PROBE: NOT DETECTED
G LAMBLIA DNA STL QL NAA+NON-PROBE: NOT DETECTED
S ENT+BONG DNA STL QL NAA+NON-PROBE: NOT DETECTED
SHIGELLA SP+EIEC IPAH ST NAA+NON-PROBE: NOT DETECTED

## 2023-11-08 ENCOUNTER — OFFICE VISIT (OUTPATIENT)
Dept: FAMILY MEDICINE CLINIC | Facility: CLINIC | Age: 58
End: 2023-11-08
Payer: MEDICAID

## 2023-11-08 VITALS
OXYGEN SATURATION: 98 % | TEMPERATURE: 98.3 F | DIASTOLIC BLOOD PRESSURE: 88 MMHG | HEART RATE: 73 BPM | SYSTOLIC BLOOD PRESSURE: 142 MMHG | HEIGHT: 66 IN | WEIGHT: 242 LBS | BODY MASS INDEX: 38.89 KG/M2

## 2023-11-08 DIAGNOSIS — R10.30 LOWER ABDOMINAL PAIN: ICD-10-CM

## 2023-11-08 DIAGNOSIS — M79.672 LEFT FOOT PAIN: ICD-10-CM

## 2023-11-08 DIAGNOSIS — G56.03 CARPAL TUNNEL SYNDROME ON BOTH SIDES: Primary | ICD-10-CM

## 2023-11-08 PROCEDURE — 99214 OFFICE O/P EST MOD 30 MIN: CPT

## 2023-11-08 PROCEDURE — 1159F MED LIST DOCD IN RCRD: CPT

## 2023-11-08 PROCEDURE — 1160F RVW MEDS BY RX/DR IN RCRD: CPT

## 2023-11-08 RX ORDER — LOSARTAN POTASSIUM 50 MG/1
50 TABLET ORAL DAILY
Qty: 90 TABLET | Refills: 1 | Status: SHIPPED | OUTPATIENT
Start: 2023-11-08 | End: 2024-05-06

## 2023-11-08 RX ORDER — TRAZODONE HYDROCHLORIDE 50 MG/1
50 TABLET ORAL NIGHTLY
Qty: 30 TABLET | Refills: 0 | Status: SHIPPED | OUTPATIENT
Start: 2023-11-08 | End: 2023-12-08
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
## 2023-11-17 ENCOUNTER — OFFICE VISIT (OUTPATIENT)
Dept: ORTHOPEDIC SURGERY | Facility: CLINIC | Age: 58
End: 2023-11-17
Payer: MEDICAID

## 2023-11-17 VITALS
DIASTOLIC BLOOD PRESSURE: 86 MMHG | OXYGEN SATURATION: 98 % | WEIGHT: 245 LBS | SYSTOLIC BLOOD PRESSURE: 144 MMHG | BODY MASS INDEX: 39.37 KG/M2 | HEART RATE: 77 BPM | HEIGHT: 66 IN

## 2023-11-17 DIAGNOSIS — M79.642 LEFT HAND PAIN: Primary | ICD-10-CM

## 2023-11-17 DIAGNOSIS — M79.641 RIGHT HAND PAIN: ICD-10-CM

## 2023-11-17 DIAGNOSIS — M18.0 OSTEOARTHRITIS OF CARPOMETACARPAL (CMC) JOINTS OF BOTH THUMBS, UNSPECIFIED OSTEOARTHRITIS TYPE: ICD-10-CM

## 2023-11-17 DIAGNOSIS — R20.2 PARESTHESIA OF HAND, BILATERAL: ICD-10-CM

## 2023-11-17 RX ORDER — MELOXICAM 15 MG/1
15 TABLET ORAL DAILY
Qty: 30 TABLET | Refills: 1 | Status: SHIPPED | OUTPATIENT
Start: 2023-11-17

## 2023-11-17 NOTE — PROGRESS NOTES
"Chief Complaint  Initial Evaluation of the Left Hand and Initial Evaluation of the Right Hand     Subjective      Carlota Hare presents to [facility] for evaluation of the bilateral hands. She reports bilateral hand pain. She reports her right is worse than the left. She had topicals that broke her out. She reports decreased  strength. She reports burning sensations and aching pain at night to the right hand.     Allergies   Allergen Reactions    Codeine Other (See Comments)     She states she gets hyper    Demerol [Meperidine] Rash     Headache also    Hydrocodone-Acetaminophen Rash    Percocet [Oxycodone-Acetaminophen] Rash    Topiramate Other (See Comments)     Rash around her face. Urticaria    Tramadol Unknown (See Comments)     Severe depression when coming off of it    Tylenol [Acetaminophen] Rash     Pt stated  Headache and rash        Social History     Socioeconomic History    Marital status:      Spouse name: Eddie    Number of children: 1    Years of education: College   Tobacco Use    Smoking status: Never     Passive exposure: Never    Smokeless tobacco: Never   Vaping Use    Vaping Use: Never used   Substance and Sexual Activity    Alcohol use: No    Drug use: No    Sexual activity: Defer        I reviewed the patient's chief complaint, history of present illness, review of systems, past medical history, surgical history, family history, social history, medications, and allergy list.     Review of Systems     Constitutional: Denies fevers, chills, weight loss  Cardiovascular: Denies chest pain, shortness of breath  Skin: Denies rashes, acute skin changes  Neurologic: Denies headache, loss of consciousness  MSK: bilateral hand pain      Vital Signs:   /86   Pulse 77   Ht 167.6 cm (66\")   Wt 111 kg (245 lb)   SpO2 98%   BMI 39.54 kg/m²          Physical Exam  General: Alert. No acute distress    Ortho Exam      Right hand- negative grind testing. " Mild tender to the thumb CMC joint. Finger motion intact.. positive Tinel and Compression testing. Sensation to light touch median, radial, ulnar nerve. Positive AIN, PIN, ulnar nerve motor function. Positive pulses.     Left hand- tender to the thumb CMC joint. Finger motion intact.. positive Tinel and Compression testing. Sensation to light touch median, radial, ulnar nerve. Positive AIN, PIN, ulnar nerve motor function. Positive pulses.     Procedures    X-Ray Report:  Right hand  X-Ray  Indication: Evaluation of right hand pain  AP/Lateral view(s)  Findings: mild degenerative changes to the thumb CMC joint. No acute fracture, ossification to ulnar styloid from previous injury.   Prior studies available for comparison: no     X-Ray Report:  Left hand  X-Ray  Indication: Evaluation of left hand pain  AP/Lateral view(s)  Findings: moderate degenerative changes to the thumb CMC joint. No acute fracture  Prior studies available for comparison: no       Imaging Results (Most Recent)       Procedure Component Value Units Date/Time    XR Hand 2 View Right [[id number]] Resulted: 11/17/23 0805     Updated: 11/17/23 0808    XR Hand 2 View Left [[id number]] Resulted: 11/17/23 0805     Updated: 11/17/23 0808             Result Review :       No results found.           Assessment and Plan     Diagnoses and all orders for this visit:    1. Left hand pain (Primary)  -     XR Hand 2 View Left    2. Right hand pain  -     XR Hand 2 View Right    3. Osteoarthritis of carpometacarpal (CMC) joints of both thumbs, unspecified osteoarthritis type    4. Paresthesia of hand, bilateral        Discussed the treatment plan with the patient.  I reviewed the x-rays that were obtained today with the patient. Plan for EMG to evaluate for bilateral carpal tunnel syndrome. The patient expressed understanding and wished to proceed. Prescription for hydrocortisone cream given today. Prescription for mobic given today    Call or return if worsening  symptoms.    Follow Up     EMG results      Patient was given instructions and counseling regarding her condition or for health maintenance advice. Please see specific information pulled into the AVS if appropriate.     Scribed for Bernardino Boland MD by Socorro Silverman.  11/17/23   08:07 EST    I have personally performed the services described in this document as scribed by the above individual and it is both accurate and complete. Bernardino Boland MD 11/18/23

## 2023-12-05 ENCOUNTER — HOSPITAL ENCOUNTER (OUTPATIENT)
Facility: HOSPITAL | Age: 58
Discharge: HOME | End: 2023-12-05
Admitting: ORTHOPAEDIC SURGERY
Payer: MEDICAID

## 2023-12-05 DIAGNOSIS — R20.2 PARESTHESIA OF HAND, BILATERAL: ICD-10-CM

## 2023-12-05 PROCEDURE — 95886 MUSC TEST DONE W/N TEST COMP: CPT

## 2023-12-05 PROCEDURE — 95910 NRV CNDJ TEST 7-8 STUDIES: CPT | Performed by: PHYSICAL THERAPIST

## 2023-12-05 PROCEDURE — 95911 NRV CNDJ TEST 9-10 STUDIES: CPT

## 2023-12-05 PROCEDURE — 95886 MUSC TEST DONE W/N TEST COMP: CPT | Performed by: PHYSICAL THERAPIST

## 2023-12-05 NOTE — THERAPY EVALUATION
Physical Therapy Evaluation    SUBJECTIVE  Please see EMG report for details    OBJECTIVE  Please see EMG report for details.    ASSESSMENT  This was an abnormal study.     Please see EMG report for details.    PLAN  LTG 1:  Today:  Complete EMG / NCV study as appropriate and provide full report to the referring provider.   Treatment:  None    Frequency/Duration:  Evaluation only and discharge today.    Pt/responsible party agrees with plan of care and has been informed of all alternatives, risks and benefits.

## 2023-12-06 ENCOUNTER — OFFICE VISIT (OUTPATIENT)
Dept: FAMILY MEDICINE CLINIC | Facility: CLINIC | Age: 58
End: 2023-12-06
Payer: MEDICAID

## 2023-12-06 VITALS
OXYGEN SATURATION: 98 % | SYSTOLIC BLOOD PRESSURE: 120 MMHG | BODY MASS INDEX: 39.37 KG/M2 | WEIGHT: 245 LBS | TEMPERATURE: 97.6 F | HEART RATE: 68 BPM | DIASTOLIC BLOOD PRESSURE: 86 MMHG | HEIGHT: 66 IN

## 2023-12-06 DIAGNOSIS — G47.00 INSOMNIA, UNSPECIFIED TYPE: ICD-10-CM

## 2023-12-06 DIAGNOSIS — F32.A DEPRESSION, UNSPECIFIED DEPRESSION TYPE: Primary | ICD-10-CM

## 2023-12-06 DIAGNOSIS — I10 ESSENTIAL HYPERTENSION: ICD-10-CM

## 2023-12-06 PROCEDURE — 99214 OFFICE O/P EST MOD 30 MIN: CPT

## 2023-12-06 PROCEDURE — 1159F MED LIST DOCD IN RCRD: CPT

## 2023-12-06 PROCEDURE — 1160F RVW MEDS BY RX/DR IN RCRD: CPT

## 2023-12-06 RX ORDER — TRAZODONE HYDROCHLORIDE 100 MG/1
100 TABLET ORAL NIGHTLY
Qty: 30 TABLET | Refills: 0 | Status: SHIPPED | OUTPATIENT
Start: 2023-12-06 | End: 2024-01-05

## 2023-12-06 RX ORDER — BUPROPION HYDROCHLORIDE 150 MG/1
150 TABLET ORAL DAILY
Qty: 30 TABLET | Refills: 0 | Status: SHIPPED | OUTPATIENT
Start: 2023-12-06 | End: 2024-01-05

## 2023-12-06 NOTE — PROGRESS NOTES
Chief Complaint  Chief Complaint   Patient presents with    Depression    Insomnia       HPI:  Carlota Hare presents to [facility]    Patient does state that her sleeping is not consistent she is currently on 50 mg of trazodone.  She would like to increase this dose at this time.    Patient would like to discuss possible antidepressant states that she is going through a divorce.  She also would like an antidepressant that helps with weight loss.    Patient has a history of hypertension.  She is doing well on losartan 50 mg daily.    Procedures     Past History:  Medical History: has a past medical history of Anemia, Anxiety, CPAP (continuous positive airway pressure) dependence, H/O Lymphedema, History of anemia, History of foreign travel, History of hypoglycemia, History of vitamin D deficiency, Knee meniscus pain, left, OAB (overactive bladder), RICHARD (obstructive sleep apnea), and Poor vision.   Surgical History: has a past surgical history that includes Shoulder surgery (Left, 2018); Cholecystectomy; Lymph node dissection;  section (); Hemorrhoid surgery (); Rotator cuff repair (Right, ); Dilation and curettage of uterus; Neck surgery (); and Colonoscopy (N/A, 2023).   Family History: family history includes Anxiety disorder in her maternal aunt, maternal aunt, maternal grandmother, and mother; Arthritis in her mother; Asthma in her mother; Breast cancer in her maternal aunt; Cancer in her maternal aunt and maternal aunt; Clotting disorder in her father; Depression in her mother; Diabetes in her maternal aunt, maternal aunt, mother, and another family member; Hyperlipidemia in her maternal aunt, maternal aunt, maternal uncle, mother, and another family member; Hypertension in an other family member; Lung cancer in an other family member; Skin cancer in her mother.   Social History: reports that she has never smoked. She has never been exposed to  "tobacco smoke. She has never used smokeless tobacco. She reports that she does not drink alcohol and does not use drugs.  Immunization History   Administered Date(s) Administered    Flu Vaccine Split Quad 09/26/2020    Fluzone Quad >6mos (Multi-dose) 10/10/2020    Influenza Quad Vaccine (Inpatient) 09/15/2016, 09/13/2017    Tdap 06/30/2020         Allergies: Codeine, Demerol [meperidine], Hydrocodone-acetaminophen, Percocet [oxycodone-acetaminophen], Topiramate, Tramadol, and Tylenol [acetaminophen]     Medications:  Current Outpatient Medications on File Prior to Visit   Medication Sig Dispense Refill    hydrocortisone 2.5 % cream Apply 1 application  topically to the appropriate area as directed 2 (Two) Times a Day. 20 g 1    losartan (COZAAR) 50 MG tablet Take 1 tablet by mouth Daily for 180 days. 90 tablet 1    meloxicam (Mobic) 15 MG tablet Take 1 tablet by mouth Daily. 30 tablet 1    [DISCONTINUED] traZODone (DESYREL) 50 MG tablet Take 1 tablet by mouth Every Night for 30 days. 30 tablet 0    [DISCONTINUED] diclofenac (VOLTAREN) 50 MG EC tablet Take 1 tablet by mouth 2 (Two) Times a Day for 30 days. 60 tablet 0    [DISCONTINUED] lisinopril (PRINIVIL,ZESTRIL) 10 MG tablet Take 1 tablet by mouth Daily. 30 tablet 2     No current facility-administered medications on file prior to visit.        Health Maintenance Due   Topic Date Due    COVID-19 Vaccine (1) Never done    ZOSTER VACCINE (1 of 2) Never done    ANNUAL PHYSICAL  Never done    PAP SMEAR  Never done    BMI FOLLOWUP  08/24/2022    INFLUENZA VACCINE  08/01/2023       Vital Signs:   Vitals:    12/06/23 0732   BP: 120/86   Pulse: 68   Temp: 97.6 °F (36.4 °C)   SpO2: 98%   Weight: 111 kg (245 lb)   Height: 167.6 cm (66\")      Body mass index is 39.54 kg/m².     ROS:  Review of Systems       Physical Exam  Vitals and nursing note reviewed.   Constitutional:       Appearance: Normal appearance.   HENT:      Head: Normocephalic and atraumatic.      Nose: Nose " normal.      Mouth/Throat:      Mouth: Mucous membranes are moist.   Eyes:      Conjunctiva/sclera: Conjunctivae normal.      Pupils: Pupils are equal, round, and reactive to light.   Pulmonary:      Effort: Pulmonary effort is normal.   Abdominal:      General: Abdomen is flat.      Palpations: Abdomen is soft.   Musculoskeletal:         General: Normal range of motion.      Cervical back: Normal range of motion and neck supple.   Skin:     General: Skin is warm and dry.   Neurological:      General: No focal deficit present.      Mental Status: She is alert and oriented to person, place, and time. Mental status is at baseline.   Psychiatric:         Mood and Affect: Mood normal.         Behavior: Behavior normal.         Thought Content: Thought content normal.         Judgment: Judgment normal.          Result Review        Diagnoses and all orders for this visit:    1. Depression, unspecified depression type (Primary)  Comments:  Have started patient on Wellbutrin 150 mg daily.  She will follow-up in 1 month    2. Insomnia, unspecified type  Comments:  Have increased patient's trazodone to 100 mg.    3. Essential hypertension  Comments:  Patient is stable on losartan 50 mg daily.    Other orders  -     buPROPion XL (Wellbutrin XL) 150 MG 24 hr tablet; Take 1 tablet by mouth Daily for 30 days.  Dispense: 30 tablet; Refill: 0  -     traZODone (DESYREL) 100 MG tablet; Take 1 tablet by mouth Every Night for 30 days.  Dispense: 30 tablet; Refill: 0        Follow Up   Return in about 4 weeks (around 1/3/2024).  Patient was given instructions and counseling regarding her condition or for health maintenance advice. Please see specific information pulled into the AVS if appropriate.       ARVIND Anne

## 2023-12-11 ENCOUNTER — OFFICE VISIT (OUTPATIENT)
Dept: ORTHOPEDIC SURGERY | Facility: CLINIC | Age: 58
End: 2023-12-11
Payer: MEDICAID

## 2023-12-11 VITALS
SYSTOLIC BLOOD PRESSURE: 154 MMHG | HEART RATE: 92 BPM | HEIGHT: 66 IN | WEIGHT: 244.71 LBS | DIASTOLIC BLOOD PRESSURE: 79 MMHG | OXYGEN SATURATION: 95 % | BODY MASS INDEX: 39.33 KG/M2

## 2023-12-11 DIAGNOSIS — M79.642 LEFT HAND PAIN: Primary | ICD-10-CM

## 2023-12-11 DIAGNOSIS — M18.0 OSTEOARTHRITIS OF CARPOMETACARPAL (CMC) JOINTS OF BOTH THUMBS, UNSPECIFIED OSTEOARTHRITIS TYPE: ICD-10-CM

## 2023-12-11 DIAGNOSIS — R20.2 PARESTHESIA OF HAND, BILATERAL: ICD-10-CM

## 2023-12-11 DIAGNOSIS — M79.641 RIGHT HAND PAIN: ICD-10-CM

## 2023-12-11 PROCEDURE — 99213 OFFICE O/P EST LOW 20 MIN: CPT | Performed by: PHYSICIAN ASSISTANT

## 2023-12-11 PROCEDURE — 1159F MED LIST DOCD IN RCRD: CPT | Performed by: PHYSICIAN ASSISTANT

## 2023-12-11 PROCEDURE — 1160F RVW MEDS BY RX/DR IN RCRD: CPT | Performed by: PHYSICIAN ASSISTANT

## 2023-12-11 NOTE — PROGRESS NOTES
Chief Complaint  Pain and Follow-up of the Left Hand and Pain and Follow-up of the Right Hand    Subjective          History of Present Illness      Carlota Hare is a 58 y.o. female  presents to [facility] for     Patient presents for follow-up evaluation of bilateral hand pain,.  Dr. Boland ordered nerve test for her and she is here to review this.  She had a rash at last visit and states that this has resolved.  She has been taking meloxicam with some relief.  She states she has pain at the base of the first and second fingers at the metacarpal joint she also admits to cramping type pain occasionally in the hands that radiates from the base of her fingers across her dorsum of her hand bilaterally.      Allergies   Allergen Reactions    Codeine Other (See Comments)     She states she gets hyper    Demerol [Meperidine] Rash     Headache also    Hydrocodone-Acetaminophen Rash    Percocet [Oxycodone-Acetaminophen] Rash    Topiramate Other (See Comments)     Rash around her face. Urticaria    Tramadol Unknown (See Comments)     Severe depression when coming off of it    Tylenol [Acetaminophen] Rash     Pt stated  Headache and rash        Social History     Socioeconomic History    Marital status:      Spouse name: Eddie    Number of children: 1    Years of education: College   Tobacco Use    Smoking status: Never     Passive exposure: Never    Smokeless tobacco: Never   Vaping Use    Vaping Use: Never used   Substance and Sexual Activity    Alcohol use: No    Drug use: No    Sexual activity: Not Currently        REVIEW OF SYSTEMS    Constitutional: Awake alert and oriented x3, no acute distress, denies fevers, chills, weight loss  Respiratory: No respiratory distress  Vascular: Brisk cap refill, Intact distal pulses, No cyanosis, compartments soft with no signs or symptoms of compartment syndrome or DVT.   Cardiovascular: Denies chest pain, shortness of breath  Skin: Denies  "rashes, acute skin changes  Neurologic: Denies headache, loss of consciousness  MSK: Bilateral hand pain      Objective   Vital Signs:   /79   Pulse 92   Ht 167.6 cm (66\")   Wt 111 kg (244 lb 11.4 oz)   SpO2 95%   BMI 39.50 kg/m²     Body mass index is 39.5 kg/m².    Physical Exam       Right hand- negative grind testing. Mild tender to the thumb CMC joint. Finger motion intact.. positive Tinel and Compression testing. Sensation to light touch median, radial, ulnar nerve. Positive AIN, PIN, ulnar nerve motor function. Positive pulses.      Left hand- tender to the thumb CMC joint. Finger motion intact.. positive Tinel and Compression testing. Sensation to light touch median, radial, ulnar nerve. Positive AIN, PIN, ulnar nerve motor function. Positive pulses.       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  There is no electrophysiologic evidence suggestive of a focal neuropathy of the right median nerve at the wrist.  2.  No electrophysiologic evidence of a cervical radiculopathy, brachial plexopathy additional focal neuropathy polyneuropathy changes myopathy or motor neuron pathology at this time, 12/5/2023    Result Review :   The following data was reviewed by: LETY Varghese on 12/11/2023:               Assessment and Plan    Diagnoses and all orders for this visit:    1. Left hand pain (Primary)  -     MRI Hand Left Without Contrast; Future    2. Right hand pain  -     MRI Hand Right Without Contrast; Future    3. Osteoarthritis of carpometacarpal (CMC) joints of both thumbs, unspecified osteoarthritis type  -     MRI Hand Right Without Contrast; Future  -     MRI Hand Left Without Contrast; Future    4. Paresthesia of hand, bilateral        Reviewed nerve test with the patient, discussed diagnosis and " treatment options with the patient she was advised we will order MRI of bilateral hands for further evaluation follow-up after MRIs    Call or return if worsening symptoms.    Follow Up   Return for After MRI.  Patient was given instructions and counseling regarding her condition or for health maintenance advice. Please see specific information pulled into the AVS if appropriate.       EMR Dragon/Transcription disclaimer:  Much of this encounter note is an electronic transcription/translation of spoken language to printed text, aka voice recognition.  The electronic translation of spoken language may permit erroneous or at times nonsensical words or phrases to be inadvertently transcribed; although I have reviewed the note for such errors, some may still exist so please interpret based on surrounding text content.

## 2024-01-02 RX ORDER — BUPROPION HYDROCHLORIDE 150 MG/1
150 TABLET ORAL DAILY
Qty: 30 TABLET | Refills: 0 | Status: SHIPPED | OUTPATIENT
Start: 2024-01-02 | End: 2024-02-01

## 2024-01-03 RX ORDER — TRAZODONE HYDROCHLORIDE 100 MG/1
100 TABLET ORAL NIGHTLY
Qty: 30 TABLET | Refills: 0 | Status: SHIPPED | OUTPATIENT
Start: 2024-01-03 | End: 2024-01-08

## 2024-01-03 RX ORDER — BUPROPION HYDROCHLORIDE 150 MG/1
150 TABLET ORAL DAILY
Qty: 30 TABLET | Refills: 0 | OUTPATIENT
Start: 2024-01-03

## 2024-01-08 ENCOUNTER — OFFICE VISIT (OUTPATIENT)
Dept: FAMILY MEDICINE CLINIC | Facility: CLINIC | Age: 59
End: 2024-01-08
Payer: COMMERCIAL

## 2024-01-08 VITALS
TEMPERATURE: 98 F | DIASTOLIC BLOOD PRESSURE: 88 MMHG | SYSTOLIC BLOOD PRESSURE: 132 MMHG | HEART RATE: 72 BPM | WEIGHT: 247 LBS | BODY MASS INDEX: 39.7 KG/M2 | HEIGHT: 66 IN | OXYGEN SATURATION: 99 %

## 2024-01-08 DIAGNOSIS — R20.0 RIGHT LEG NUMBNESS: ICD-10-CM

## 2024-01-08 DIAGNOSIS — R21 RASH OF BOTH HANDS: ICD-10-CM

## 2024-01-08 DIAGNOSIS — M79.604 RIGHT LEG PAIN: ICD-10-CM

## 2024-01-08 DIAGNOSIS — G47.00 INSOMNIA, UNSPECIFIED TYPE: Primary | ICD-10-CM

## 2024-01-08 DIAGNOSIS — I10 PRIMARY HYPERTENSION: ICD-10-CM

## 2024-01-08 PROCEDURE — 3079F DIAST BP 80-89 MM HG: CPT

## 2024-01-08 PROCEDURE — 3075F SYST BP GE 130 - 139MM HG: CPT

## 2024-01-08 PROCEDURE — 99214 OFFICE O/P EST MOD 30 MIN: CPT

## 2024-01-08 RX ORDER — CLONIDINE HYDROCHLORIDE 0.1 MG/1
0.1 TABLET ORAL NIGHTLY
Qty: 30 TABLET | Refills: 0 | Status: SHIPPED | OUTPATIENT
Start: 2024-01-08 | End: 2024-01-09

## 2024-01-08 RX ORDER — TRIAMCINOLONE ACETONIDE 1 MG/G
1 OINTMENT TOPICAL 2 TIMES DAILY
Qty: 80 G | Refills: 2 | Status: SHIPPED | OUTPATIENT
Start: 2024-01-08

## 2024-01-08 RX ORDER — LOSARTAN POTASSIUM 25 MG/1
25 TABLET ORAL DAILY
COMMUNITY

## 2024-01-08 NOTE — PROGRESS NOTES
Chief Complaint  Chief Complaint   Patient presents with    Depression    Insomnia    Hypertension    Rash     Rash on bilateral hands    Numbness     Numbness of lateral aspect of right thigh from hip to knee       HPI:  Carlota Hare presents to [facility]  Patient presents today for 1 month follow-up on depression.  States that she has been doing well on the Wellbutrin 150 mg daily.  She is going through divorce at this time and states that this has been difficult.    Patient states that trazodone has not been helping with sleep.  She was on 100 mg of trazodone nightly.    Patient complains of rash on her hands.  States that she was given a hydrocortisone ointment but that it is not completely going away and starting to resurface.    Patient complains of numbness on right thigh that has felt like it is burning or is extremely cold goes from hip to knee on the lateral side.  Patient states that she has not done any physical therapy at this time.      Patient's blood pressure on today's visit was 132/88.  He is doing well on losartan 75 mg daily.  Procedures     Past History:  Medical History: has a past medical history of Anemia, Anxiety, CPAP (continuous positive airway pressure) dependence, H/O Lymphedema, History of anemia, History of foreign travel, History of hypoglycemia, History of vitamin D deficiency, Knee meniscus pain, left, OAB (overactive bladder), RICHARD (obstructive sleep apnea), and Poor vision.   Surgical History: has a past surgical history that includes Shoulder surgery (Left, 2018); Cholecystectomy; Lymph node dissection;  section (); Hemorrhoid surgery (); Rotator cuff repair (Right, ); Dilation and curettage of uterus; Neck surgery (); and Colonoscopy (N/A, 2023).   Family History: family history includes Anxiety disorder in her maternal aunt, maternal aunt, maternal grandmother, and mother; Arthritis in her mother; Asthma in her  mother; Breast cancer in her maternal aunt; Cancer in her maternal aunt and maternal aunt; Clotting disorder in her father; Depression in her mother; Diabetes in her maternal aunt, maternal aunt, mother, and another family member; Hyperlipidemia in her maternal aunt, maternal aunt, maternal uncle, mother, and another family member; Hypertension in an other family member; Lung cancer in an other family member; Skin cancer in her mother.   Social History: reports that she has never smoked. She has never been exposed to tobacco smoke. She has never used smokeless tobacco. She reports that she does not drink alcohol and does not use drugs.  Immunization History   Administered Date(s) Administered    Flu Vaccine Split Quad 09/26/2020    Fluzone (or Fluarix & Flulaval for VFC) >6mos 11/08/2022    Fluzone Quad >6mos (Multi-dose) 10/10/2020    Influenza Injectable Mdck Pf Quad 10/20/2021    Influenza MDCK Quadrivalent with Preserative 09/26/2020    Influenza Quad Vaccine (Inpatient) 09/15/2016, 09/13/2017    Influenza, Unspecified 09/01/2023    Tdap 06/30/2020         Allergies: Codeine, Demerol [meperidine], Hydrocodone-acetaminophen, Percocet [oxycodone-acetaminophen], Topiramate, Tramadol, and Tylenol [acetaminophen]     Medications:  Current Outpatient Medications on File Prior to Visit   Medication Sig Dispense Refill    buPROPion XL (Wellbutrin XL) 150 MG 24 hr tablet Take 1 tablet by mouth Daily for 30 days. 30 tablet 0    hydrocortisone 2.5 % cream Apply 1 application  topically to the appropriate area as directed 2 (Two) Times a Day. 20 g 1    losartan (COZAAR) 50 MG tablet Take 1 tablet by mouth Daily for 180 days. 90 tablet 1    meloxicam (Mobic) 15 MG tablet Take 1 tablet by mouth Daily. 30 tablet 1    losartan (COZAAR) 25 MG tablet Take 1 tablet by mouth Daily.      [DISCONTINUED] lisinopril (PRINIVIL,ZESTRIL) 10 MG tablet Take 1 tablet by mouth Daily. 30 tablet 2    [DISCONTINUED] traZODone (DESYREL) 100 MG  "tablet TAKE 1 TABLET BY MOUTH EVERY NIGHT 30 tablet 0     No current facility-administered medications on file prior to visit.        Health Maintenance Due   Topic Date Due    COVID-19 Vaccine (1) Never done    ZOSTER VACCINE (1 of 2) Never done    ANNUAL PHYSICAL  Never done    PAP SMEAR  Never done    BMI FOLLOWUP  08/24/2022       Vital Signs:   Vitals:    01/08/24 0803   BP: 132/88   BP Location: Right arm   Patient Position: Sitting   Cuff Size: Adult   Pulse: 72   Temp: 98 °F (36.7 °C)   TempSrc: Oral   SpO2: 99%   Weight: 112 kg (247 lb)   Height: 167.6 cm (66\")      Body mass index is 39.87 kg/m².     ROS:  Review of Systems       Physical Exam     Result Review        Diagnoses and all orders for this visit:    1. Insomnia, unspecified type (Primary)  Comments:  Have started patient on clonidine 0.1 mg  Orders:  -     Ambulatory Referral to Sleep Medicine    2. Right leg numbness  Comments:  I referred patient to physical therapy  Orders:  -     Ambulatory Referral to Physical Therapy Evaluate and treat    3. Right leg pain  Comments:  Have referred patient to physical therapy  Orders:  -     Ambulatory Referral to Physical Therapy Evaluate and treat    4. Rash of both hands  Comments:  Have ordered patient triamcinolone cream with refills.    5. Primary hypertension  Comments:  Patient is stable on losartan 75 mg.  Have added clonidine 0.1 mg tablets daily    Other orders  -     cloNIDine (Catapres) 0.1 MG tablet; Take 1 tablet by mouth Every Night for 30 days.  Dispense: 30 tablet; Refill: 0  -     triamcinolone (KENALOG) 0.1 % ointment; Apply 1 application  topically to the appropriate area as directed 2 (Two) Times a Day.  Dispense: 80 g; Refill: 2         Follow Up   No follow-ups on file.  Patient was given instructions and counseling regarding her condition or for health maintenance advice. Please see specific information pulled into the AVS if appropriate.       ARVIND Anne  "

## 2024-01-09 RX ORDER — CLONIDINE HYDROCHLORIDE 0.1 MG/1
0.1 TABLET ORAL NIGHTLY
Qty: 90 TABLET | Refills: 0 | Status: SHIPPED | OUTPATIENT
Start: 2024-01-09

## 2024-01-12 ENCOUNTER — HOSPITAL ENCOUNTER (OUTPATIENT)
Dept: GENERAL RADIOLOGY | Facility: HOSPITAL | Age: 59
Discharge: HOME | End: 2024-01-12
Payer: COMMERCIAL

## 2024-01-12 DIAGNOSIS — M79.672 LEFT FOOT PAIN: ICD-10-CM

## 2024-01-12 DIAGNOSIS — R10.30 LOWER ABDOMINAL PAIN: ICD-10-CM

## 2024-01-12 PROCEDURE — 74018 RADEX ABDOMEN 1 VIEW: CPT

## 2024-01-12 PROCEDURE — 73630 X-RAY EXAM OF FOOT: CPT

## 2024-01-12 NOTE — PROGRESS NOTES
There are some degenerative changes in the left foot.  I would like to send her to podiatry if she is agreeable?

## 2024-01-15 DIAGNOSIS — M77.8 ENTHESOPATHY OF FOOT: Primary | ICD-10-CM

## 2024-01-17 ENCOUNTER — PATIENT ROUNDING (BHMG ONLY) (OUTPATIENT)
Dept: OBSTETRICS AND GYNECOLOGY | Facility: CLINIC | Age: 59
End: 2024-01-17
Payer: COMMERCIAL

## 2024-01-17 ENCOUNTER — OFFICE VISIT (OUTPATIENT)
Dept: OBSTETRICS AND GYNECOLOGY | Facility: CLINIC | Age: 59
End: 2024-01-17
Payer: COMMERCIAL

## 2024-01-17 VITALS
BODY MASS INDEX: 40.02 KG/M2 | WEIGHT: 249 LBS | DIASTOLIC BLOOD PRESSURE: 90 MMHG | HEART RATE: 104 BPM | SYSTOLIC BLOOD PRESSURE: 141 MMHG | HEIGHT: 66 IN

## 2024-01-17 DIAGNOSIS — E66.01 MORBID OBESITY: ICD-10-CM

## 2024-01-17 DIAGNOSIS — R10.2 PELVIC PAIN: Primary | ICD-10-CM

## 2024-01-17 DIAGNOSIS — N94.2 VAGINISMUS: ICD-10-CM

## 2024-01-17 PROBLEM — D89.831 CYTOKINE RELEASE SYNDROME, GRADE 1: Status: RESOLVED | Noted: 2021-08-16 | Resolved: 2024-01-17

## 2024-01-17 PROBLEM — J18.9 RIGHT UPPER LOBE PNEUMONIA: Status: RESOLVED | Noted: 2021-08-13 | Resolved: 2024-01-17

## 2024-01-17 PROBLEM — R06.02 SHORTNESS OF BREATH: Status: RESOLVED | Noted: 2018-04-16 | Resolved: 2024-01-17

## 2024-01-17 PROBLEM — U07.1 COVID-19 VIRUS INFECTION: Status: RESOLVED | Noted: 2021-08-13 | Resolved: 2024-01-17

## 2024-01-17 PROBLEM — J18.9 PNEUMONIA OF RIGHT UPPER LOBE DUE TO INFECTIOUS ORGANISM: Status: RESOLVED | Noted: 2021-08-13 | Resolved: 2024-01-17

## 2024-01-17 LAB
BILIRUB UR QL STRIP: NEGATIVE
CLARITY UR: ABNORMAL
COLOR UR: YELLOW
GLUCOSE UR STRIP-MCNC: NEGATIVE MG/DL
HGB UR QL STRIP.AUTO: NEGATIVE
HOLD SPECIMEN: NORMAL
KETONES UR QL STRIP: ABNORMAL
LEUKOCYTE ESTERASE UR QL STRIP.AUTO: NEGATIVE
NITRITE UR QL STRIP: NEGATIVE
PH UR STRIP.AUTO: 5.5 [PH] (ref 5–8)
PROT UR QL STRIP: NEGATIVE
SP GR UR STRIP: 1.03 (ref 1–1.03)
UROBILINOGEN UR QL STRIP: ABNORMAL

## 2024-01-17 PROCEDURE — G0123 SCREEN CERV/VAG THIN LAYER: HCPCS

## 2024-01-17 PROCEDURE — 87624 HPV HI-RISK TYP POOLED RSLT: CPT

## 2024-01-17 PROCEDURE — 81003 URINALYSIS AUTO W/O SCOPE: CPT | Performed by: STUDENT IN AN ORGANIZED HEALTH CARE EDUCATION/TRAINING PROGRAM
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
## 2024-01-17 NOTE — PROGRESS NOTES
A My-Chart message has been sent to the patient for PATIENT ROUNDING with [facility] [place].

## 2024-01-18 ENCOUNTER — TELEPHONE (OUTPATIENT)
Dept: FAMILY MEDICINE CLINIC | Facility: CLINIC | Age: 59
End: 2024-01-18
Payer: COMMERCIAL

## 2024-01-18 DIAGNOSIS — M18.0 OSTEOARTHRITIS OF CARPOMETACARPAL (CMC) JOINTS OF BOTH THUMBS, UNSPECIFIED OSTEOARTHRITIS TYPE: ICD-10-CM

## 2024-01-18 DIAGNOSIS — R20.2 PARESTHESIA OF HAND, BILATERAL: ICD-10-CM

## 2024-01-18 RX ORDER — MELOXICAM 15 MG/1
15 TABLET ORAL DAILY
Qty: 30 TABLET | Refills: 1 | Status: SHIPPED | OUTPATIENT
Start: 2024-01-18

## 2024-01-18 NOTE — TELEPHONE ENCOUNTER
Caller: Carlota Hare    Relationship: Self    Best call back number:    [phone]       Requested Prescriptions:   Requested Prescriptions     Pending Prescriptions Disp Refills    losartan (COZAAR) 25 MG tablet       Sig: Take 1 tablet by mouth Daily.        Pharmacy where request should be sent: [id number] - [place], [place] - [address] [facility] AT [facility] OF [address]/[facility] & [place] - [phone] [facility] [phone] FX     Last office visit with prescribing clinician: 1/8/2024   Last telemedicine visit with prescribing clinician: Visit date not found   Next office visit with prescribing clinician: Visit date not found     Additional details provided by patient: PATIENT STATES SHE DID RECEIVE THE 50 MG TABLETS OF MEDICATION HOWEVER PHARMACY ADVISED THEY DID NOT GET AN ORDER FOR THE 25 MG. PLEASE SEND PRESCRIPTION FOR 25 MG OVER AS PATIENT STATES SHE IS SUPPOSED TO BE TAKING A TOTAL OF 75 MG DAILY.     Does the patient have less than a 3 day supply:  [x] Yes  [] No    Would you like a call back once the refill request has been completed: [] Yes [x] No    If the office needs to give you a call back, can they leave a voicemail: [] Yes [x] No    Grady Silva Rep   01/18/24 09:44 EST

## 2024-01-18 NOTE — TELEPHONE ENCOUNTER
Spoke to referral team since it stated cannot be scheduled at this time on referral. Referral team will get back with me and I will call the patient.

## 2024-01-18 NOTE — TELEPHONE ENCOUNTER
Caller: Carlota Hare    Relationship to patient: Self    Best call back number:    [phone]     Patient is needing: PATIENT STATES SHE HAD NOT HEARD BACK REGARDING ORDER FOR PHYSICAL THERAPY AND WOULD LIKE TO BE ADVISED IF SHE NEEDS TO SCHEDULE THAT OR IF WE WILL BE SCHEDULING AND WHEN THAT WILL BE DONE.

## 2024-01-19 LAB
CYTOLOGIST CVX/VAG CYTO: NORMAL
CYTOLOGY CVX/VAG DOC CYTO: NORMAL
CYTOLOGY CVX/VAG DOC THIN PREP: NORMAL
DX ICD CODE: NORMAL
HIV 1 & 2 AB SER-IMP: NORMAL
HPV I/H RISK 4 DNA CVX QL PROBE+SIG AMP: NEGATIVE
OTHER STN SPEC: NORMAL
STAT OF ADQ CVX/VAG CYTO-IMP: NORMAL

## 2024-01-19 RX ORDER — LOSARTAN POTASSIUM 25 MG/1
25 TABLET ORAL DAILY
Qty: 90 TABLET | Refills: 1 | Status: SHIPPED | OUTPATIENT
Start: 2024-01-19 | End: 2024-07-17

## 2024-01-25 ENCOUNTER — HOSPITAL ENCOUNTER (OUTPATIENT)
Dept: MRI IMAGING | Facility: HOSPITAL | Age: 59
Discharge: HOME | End: 2024-01-25
Payer: COMMERCIAL

## 2024-01-25 ENCOUNTER — HOSPITAL ENCOUNTER (OUTPATIENT)
Dept: MRI IMAGING | Facility: HOSPITAL | Age: 59
Discharge: HOME | End: 2024-01-25
Admitting: PHYSICIAN ASSISTANT
Payer: COMMERCIAL

## 2024-01-25 DIAGNOSIS — M18.0 OSTEOARTHRITIS OF CARPOMETACARPAL (CMC) JOINTS OF BOTH THUMBS, UNSPECIFIED OSTEOARTHRITIS TYPE: ICD-10-CM

## 2024-01-25 DIAGNOSIS — M79.642 LEFT HAND PAIN: ICD-10-CM

## 2024-01-25 DIAGNOSIS — M79.641 RIGHT HAND PAIN: ICD-10-CM

## 2024-01-25 PROCEDURE — 73218 MRI UPPER EXTREMITY W/O DYE: CPT

## 2024-01-29 ENCOUNTER — OFFICE VISIT (OUTPATIENT)
Dept: SLEEP MEDICINE | Facility: HOSPITAL | Age: 59
End: 2024-01-29
Payer: COMMERCIAL

## 2024-01-29 VITALS
DIASTOLIC BLOOD PRESSURE: 48 MMHG | HEART RATE: 89 BPM | OXYGEN SATURATION: 97 % | HEIGHT: 66 IN | BODY MASS INDEX: 40.07 KG/M2 | WEIGHT: 249.3 LBS | SYSTOLIC BLOOD PRESSURE: 105 MMHG

## 2024-01-29 DIAGNOSIS — F51.04 CHRONIC INSOMNIA: ICD-10-CM

## 2024-01-29 DIAGNOSIS — G47.30 OBSERVED SLEEP APNEA: Primary | ICD-10-CM

## 2024-01-29 DIAGNOSIS — E66.9 CLASS 2 OBESITY: ICD-10-CM

## 2024-01-29 DIAGNOSIS — R06.83 SNORING: ICD-10-CM

## 2024-01-29 DIAGNOSIS — G47.8 NON-RESTORATIVE SLEEP: ICD-10-CM

## 2024-01-29 PROBLEM — E66.01 MORBID OBESITY: Status: RESOLVED | Noted: 2021-08-13 | Resolved: 2024-01-29

## 2024-01-29 PROCEDURE — 1159F MED LIST DOCD IN RCRD: CPT | Performed by: INTERNAL MEDICINE

## 2024-01-29 PROCEDURE — G0463 HOSPITAL OUTPT CLINIC VISIT: HCPCS

## 2024-01-29 PROCEDURE — 1160F RVW MEDS BY RX/DR IN RCRD: CPT | Performed by: INTERNAL MEDICINE

## 2024-01-29 PROCEDURE — 3078F DIAST BP <80 MM HG: CPT | Performed by: INTERNAL MEDICINE

## 2024-01-29 PROCEDURE — 99204 OFFICE O/P NEW MOD 45 MIN: CPT | Performed by: INTERNAL MEDICINE

## 2024-01-29 PROCEDURE — 3074F SYST BP LT 130 MM HG: CPT | Performed by: INTERNAL MEDICINE

## 2024-01-29 RX ORDER — TEMAZEPAM 15 MG/1
15 CAPSULE ORAL NIGHTLY
Qty: 30 CAPSULE | Refills: 5 | Status: SHIPPED | OUTPATIENT
Start: 2024-01-29
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
## 2024-01-30 ENCOUNTER — OFFICE VISIT (OUTPATIENT)
Dept: ORTHOPEDIC SURGERY | Facility: CLINIC | Age: 59
End: 2024-01-30
Payer: COMMERCIAL

## 2024-01-30 VITALS
WEIGHT: 249.12 LBS | DIASTOLIC BLOOD PRESSURE: 84 MMHG | BODY MASS INDEX: 40.04 KG/M2 | HEART RATE: 76 BPM | SYSTOLIC BLOOD PRESSURE: 131 MMHG | HEIGHT: 66 IN | OXYGEN SATURATION: 95 %

## 2024-01-30 DIAGNOSIS — M18.0 OSTEOARTHRITIS OF CARPOMETACARPAL (CMC) JOINTS OF BOTH THUMBS, UNSPECIFIED OSTEOARTHRITIS TYPE: Primary | ICD-10-CM

## 2024-01-30 DIAGNOSIS — M79.641 RIGHT HAND PAIN: ICD-10-CM

## 2024-01-30 DIAGNOSIS — R20.2 PARESTHESIA OF HAND, BILATERAL: ICD-10-CM

## 2024-01-30 DIAGNOSIS — M79.642 LEFT HAND PAIN: ICD-10-CM

## 2024-01-30 RX ORDER — TRAZODONE HYDROCHLORIDE 100 MG/1
100 TABLET ORAL NIGHTLY
Qty: 30 TABLET | Refills: 0 | OUTPATIENT
Start: 2024-01-30

## 2024-01-30 NOTE — PROGRESS NOTES
Chief Complaint  Pain and Follow-up of the Right Hand and Pain and Follow-up of the Left Hand    Subjective          History of Present Illness      Carlota Hare is a 58 y.o. female  presents to [facility] for     Patient presents for follow-up evaluation of bilateral hand pain and to review bilateral hand MRIs.  She has had pain in her hands she has been taking meloxicam with some relief she has worse pain in the right hand at the base of the first and second fingers in the metacarpal joints she has cramping pain.  She has not done physical therapy.  She points to the dorsum of her hand bilaterally as areas of pain.  She had a rash in the past.      Allergies   Allergen Reactions    Codeine Other (See Comments)     She states she gets hyper    Demerol [Meperidine] Rash     Headache also    Hydrocodone-Acetaminophen Rash    Percocet [Oxycodone-Acetaminophen] Rash    Topiramate Other (See Comments)     Rash around her face. Urticaria    Tramadol Unknown (See Comments)     Severe depression when coming off of it    Tylenol [Acetaminophen] Rash     Pt stated  Headache and rash        Social History     Socioeconomic History    Marital status:      Spouse name: Eddie    Number of children: 1    Years of education: College   Tobacco Use    Smoking status: Never     Passive exposure: Never    Smokeless tobacco: Never   Vaping Use    Vaping Use: Never used   Substance and Sexual Activity    Alcohol use: No    Drug use: No    Sexual activity: Not Currently        REVIEW OF SYSTEMS    Constitutional: Awake alert and oriented x3, no acute distress, denies fevers, chills, weight loss  Respiratory: No respiratory distress  Vascular: Brisk cap refill, Intact distal pulses, No cyanosis, compartments soft with no signs or symptoms of compartment syndrome or DVT.   Cardiovascular: Denies chest pain, shortness of breath  Skin: Denies rashes, acute skin changes  Neurologic: Denies headache,  "loss of consciousness  MSK: Bilateral hand pain      Objective   Vital Signs:   /84   Pulse 76   Ht 167.6 cm (66\")   Wt 113 kg (249 lb 1.9 oz)   SpO2 95%   BMI 40.21 kg/m²     Body mass index is 40.21 kg/m².    Physical Exam       Right hand- negative grind testing. Mild tender to the thumb CMC joint. Finger motion intact.. positive Tinel and Compression testing. Sensation to light touch median, radial, ulnar nerve. Positive AIN, PIN, ulnar nerve motor function. Positive pulses.      Left hand- tender to the thumb CMC joint. Finger motion intact.. positive Tinel and Compression testing. Sensation to light touch median, radial, ulnar nerve. Positive AIN, PIN, ulnar nerve motor function. Positive pulses.          Procedures    Imaging Results (Most Recent)       None           MRI Hand Right Without Contrast    Result Date: 1/25/2024  Narrative: PROCEDURE: MRI HAND RIGHT WO CONTRAST  COMPARISON:  [facility] , CR, XR HAND 2 VW RIGHT, 11/17/2023, 8:14.  [facility] , CR, XR HAND 2 VW LEFT, 11/17/2023, 8:14.  [facility], MR, MRI HAND LEFT WO CONTRAST, 1/25/2024, 8:17. INDICATIONS: RIGHT POSTERIOR 2ND AND 3RD DIGIT KNUCKLE PAIN. NO KNOWN INJURY.      TECHNIQUE: A complete multi-planar examination was performed without contrast.  FINDINGS:  No evidence of fracture.  No suspicious focal marrow signal abnormality is seen.  Osseous alignment appears within normal limits.  There is a focus of superficial signal abnormality in the dorsal soft tissues between the distal 2nd and 3rd metacarpals.  There is susceptibility effect in this location.  No corresponding focal abnormality is seen on radiographs in this location.  No definite mass or collection is identified.  No significant muscle edema or atrophy.  No definite flexor or extensor tendon displacement, defect, or tenosynovitis is identified on these images.  No significant joint effusion.  There is suspected mild osteophyte formation " and joint space narrowing at the interphalangeal and 1st CMC joints.  No definite erosions are identified.  No definite findings of acute ligamentous injury.      Impression:   1. Focal area of superficial signal abnormality in the dorsal soft tissues between the 2nd and 3rd metacarpals with susceptibility artifact which may be seen with foreign body, however, no corresponding abnormality is seen on prior radiographs in this location and patient has no reported injury.  Correlate with clinical findings and history.  Sonographic evaluation could be performed if there is clinical concern for a non radiopaque foreign body. 2. Mild osteoarthritis. 3. No other abnormality is identified to explain patient's symptoms.     DEA HIDALGO MD       Electronically Signed and Approved By: DEA HIDALGO MD on 1/25/2024 at 15:38             MRI Hand Left Without Contrast    Result Date: 1/25/2024  Narrative: PROCEDURE: MRI HAND LEFT WO CONTRAST  COMPARISON:  [facility] , CR, XR HAND 2 VW LEFT, 11/17/2023, 8:14.  [facility] , CR, XR HAND 2 VW RIGHT, 11/17/2023, 8:14.  [facility], CR, XR FOOT 3+ VW LEFT, 1/12/2024, 10:19.  [facility], MR, MRI HAND RIGHT WO CONTRAST, 1/25/2024, 7:53. INDICATIONS: LEFT POSTERIOR 2ND DIGIT PAIN NEAR KNUCKLE. NO KNOWN INJURY.      TECHNIQUE: A complete multi-planar examination was performed without contrast.  FINDINGS:  No suspicious marrow signal abnormality.  No definite fracture.  Alignment appears within normal limits.  Superficial small hypointense signal foci along the digits on several sequences are suspected to be artifact.  No significant soft tissue edema signal or collection is identified.  No definite high-grade tendon defect, tenosynovitis, or displacement.  No significant muscle edema or atrophy.  No definite findings of an acute ligamentous injury on this exam.  There is suspected mild osteophyte formation and joint space narrowing at the  interphalangeal joints.  No significant MCP joint arthropathy or erosions.      Impression:   1. No definite findings of acute abnormality to explain patient's symptoms. 2. Mild osteoarthritis at the interphalangeal joints.     DEA HIDALGO MD       Electronically Signed and Approved By: DEA HIDALGO MD on 1/25/2024 at 14:09             XR Abdomen KUB    Result Date: 1/12/2024  Narrative: PROCEDURE: XR ABDOMEN KUB  COMPARISON: [facility], ABDOMEN SERIES ACUTE, 12/06/2009, 13:55.  INDICATIONS: lower left abdominal pain monthly for 7-10 days  FINDINGS:  Bowel gas pattern within normal limits.  No suspicious calcifications.  Cholecystectomy clips noted      Impression:  Unremarkable radiographic appearance of the abdomen     WOODY MURRAY MD       Electronically Signed and Approved By: WOODY MURRAY MD on 1/12/2024 at 14:46             XR Foot 3+ View Left    Result Date: 1/12/2024  Narrative: PROCEDURE: XR FOOT 3+ VW LEFT  COMPARISON: [facility], FOOT >OR= 3V LT, 1/11/2019, 19:13.  INDICATIONS: left foot pain along plantar side of 5th metatarsal  FINDINGS:  There is no evidence of fracture.  Mild scattered midfoot and 1st MTP joint degenerative change.  Additional mild tibiotalar joint arthritis with dorsal talar beaking.  No evidence of bony tarsal coalition.  Plantar calcaneal enthesopathy.  Mild 5th metatarsal base enthesopathy.  Os peroneum.  Soft tissues are unremarkable.      Impression:  No acute abnormality of the left foot.  Degenerative changes as above.      VEDA JACOBSON MD       Electronically Signed and Approved By: VEDA JACOBSON MD on 1/12/2024 at 12:28              Result Review :   The following data was reviewed by: LETY Varghese on 01/30/2024:  Data reviewed : Radiologic studies reviewed by me with the patient              Assessment and Plan    Diagnoses and all orders for this visit:    1. Osteoarthritis of carpometacarpal (CMC) joints of both  thumbs, unspecified osteoarthritis type (Primary)  -     Ambulatory Referral to Physical Therapy Evaluate and treat (2-3x/week for 6-8 weeks)    2. Paresthesia of hand, bilateral  -     Ambulatory Referral to Physical Therapy Evaluate and treat (2-3x/week for 6-8 weeks)    3. Left hand pain  -     Ambulatory Referral to Physical Therapy Evaluate and treat (2-3x/week for 6-8 weeks)    4. Right hand pain  -     Ambulatory Referral to Physical Therapy Evaluate and treat (2-3x/week for 6-8 weeks)        Reviewed MRIs with the patient discussed diagnosis and treatment options with her she was advised of possibility to order ultrasound for further evaluation for radiopaque foreign body patient has no history of foreign body presence and she is comfortable with not ordering ultrasound at this time.  Patient was advised we can start physical therapy for hands and she agreed to have this scheduled, continue meloxicam follow-up in 6 to 8 weeks    Call or return if worsening symptoms.    Follow Up   Return for Follow up 6-8 weeks.  Patient was given instructions and counseling regarding her condition or for health maintenance advice. Please see specific information pulled into the AVS if appropriate.       EMR Dragon/Transcription disclaimer:  Much of this encounter note is an electronic transcription/translation of spoken language to printed text, aka voice recognition.  The electronic translation of spoken language may permit erroneous or at times nonsensical words or phrases to be inadvertently transcribed; although I have reviewed the note for such errors, some may still exist so please interpret based on surrounding text content.

## 2024-01-31 RX ORDER — BUPROPION HYDROCHLORIDE 150 MG/1
150 TABLET ORAL DAILY
Qty: 30 TABLET | Refills: 0 | Status: SHIPPED | OUTPATIENT
Start: 2024-01-31

## 2024-02-07 ENCOUNTER — HOSPITAL ENCOUNTER (OUTPATIENT)
Dept: ULTRASOUND IMAGING | Facility: HOSPITAL | Age: 59
Discharge: HOME | End: 2024-02-07
Admitting: STUDENT IN AN ORGANIZED HEALTH CARE EDUCATION/TRAINING PROGRAM
Payer: COMMERCIAL

## 2024-02-07 DIAGNOSIS — R10.2 PELVIC PAIN: ICD-10-CM

## 2024-02-07 PROCEDURE — 76830 TRANSVAGINAL US NON-OB: CPT

## 2024-02-08 ENCOUNTER — TELEPHONE (OUTPATIENT)
Dept: OBSTETRICS AND GYNECOLOGY | Facility: CLINIC | Age: 59
End: 2024-02-08
Payer: COMMERCIAL

## 2024-02-20 ENCOUNTER — HOSPITAL ENCOUNTER (OUTPATIENT)
Dept: SLEEP MEDICINE | Facility: HOSPITAL | Age: 59
Discharge: HOME | End: 2024-02-20
Admitting: INTERNAL MEDICINE
Payer: COMMERCIAL

## 2024-02-20 DIAGNOSIS — G47.8 NON-RESTORATIVE SLEEP: ICD-10-CM

## 2024-02-20 DIAGNOSIS — R06.83 SNORING: ICD-10-CM

## 2024-02-20 DIAGNOSIS — E66.9 CLASS 2 OBESITY: ICD-10-CM

## 2024-02-20 DIAGNOSIS — G47.30 OBSERVED SLEEP APNEA: ICD-10-CM

## 2024-02-20 PROCEDURE — 95806 SLEEP STUDY UNATT&RESP EFFT: CPT

## 2024-02-28 ENCOUNTER — OFFICE VISIT (OUTPATIENT)
Dept: OBSTETRICS AND GYNECOLOGY | Facility: CLINIC | Age: 59
End: 2024-02-28
Payer: COMMERCIAL

## 2024-02-28 VITALS
BODY MASS INDEX: 40.56 KG/M2 | HEIGHT: 66 IN | WEIGHT: 252.4 LBS | DIASTOLIC BLOOD PRESSURE: 81 MMHG | SYSTOLIC BLOOD PRESSURE: 137 MMHG | HEART RATE: 76 BPM

## 2024-02-28 DIAGNOSIS — N94.2 VAGINISMUS: Primary | ICD-10-CM

## 2024-02-28 RX ORDER — BUPROPION HYDROCHLORIDE 150 MG/1
150 TABLET ORAL DAILY
Qty: 30 TABLET | Refills: 0 | Status: SHIPPED | OUTPATIENT
Start: 2024-02-28

## 2024-02-28 NOTE — PROGRESS NOTES
"GYN Visit    Chief Complaint   Patient presents with    Follow-up     F/UP PELVIC SCAN       HPI:   58 y.o. LMP: No LMP recorded. Patient has had an ablation. Here for follow up from US due to left lower pelvic pain and vaginismus. US was WNL We discussed pelvic floor exercises and possible vaginal weights verses dilators. She is not sexually active at this time and does not partake in masturbation. She was counseled that pelvic floor therapy and exercises can help with vaginismus and she was given different techniques and exercises's to engage in this. We also discussed further imaging if the pain worsens or persists with MRI or Ct scan. She denies any other complaints today.       History: PMHx, Meds, Allergies, PSHx, Social Hx, and POBHx all reviewed and updated.    PHYSICAL EXAM:  /81   Pulse 76   Ht 167.6 cm (65.98\")   Wt 114 kg (252 lb 6.4 oz)   BMI 40.76 kg/m²   General- NAD, alert and oriented, appropriate  Psych- Normal mood, good memory  Neck- No masses, no thyroid enlargement  Extremities- No edema, no cyanosis    Skin- No lesions, no rashes    ASSESSMENT AND PLAN:  Diagnoses and all orders for this visit:    1. Vaginismus (Primary)        Follow Up:  Return in about 11 months (around 2025) for Annual exam.    I spent 20 minutes caring for Carlota on this date of service. This time includes time spent by me in the following activities:preparing for the visit, obtaining and/or reviewing a separately obtained history, performing a medically appropriate examination and/or evaluation , ordering medications, tests, or procedures, and documenting information in the medical record    Shaina Kirk DO  2024    [facility]  [address] [place] [address]  Dept: [phone]  Dept Fax: [phone]  Loc: [phone]  Loc Fax: [phone]     " Please see attached portal message from patient.  Writer placed call to patient, explained Mrs. Rocha is out of the office today.  Advised Mrs. Rocha returns on Friday.  Patient verbalized understanding.      Please also see message dated 11-21-23 from pharmacy regarding Lidocaine prescription.

## 2024-03-04 DIAGNOSIS — G47.33 OSA (OBSTRUCTIVE SLEEP APNEA): Primary | ICD-10-CM

## 2024-03-04 DIAGNOSIS — R06.83 SNORING: ICD-10-CM

## 2024-03-13 ENCOUNTER — TELEPHONE (OUTPATIENT)
Dept: SLEEP MEDICINE | Facility: HOSPITAL | Age: 59
End: 2024-03-13
Payer: COMMERCIAL

## 2024-03-18 DIAGNOSIS — M18.0 OSTEOARTHRITIS OF CARPOMETACARPAL (CMC) JOINTS OF BOTH THUMBS, UNSPECIFIED OSTEOARTHRITIS TYPE: ICD-10-CM

## 2024-03-18 DIAGNOSIS — R20.2 PARESTHESIA OF HAND, BILATERAL: ICD-10-CM

## 2024-03-18 RX ORDER — MELOXICAM 15 MG/1
15 TABLET ORAL DAILY
Qty: 30 TABLET | Refills: 1 | Status: SHIPPED | OUTPATIENT
Start: 2024-03-18

## 2024-03-21 RX ORDER — TRIAMCINOLONE ACETONIDE 1 MG/G
OINTMENT TOPICAL 2 TIMES DAILY
Qty: 80 G | Refills: 2 | Status: SHIPPED | OUTPATIENT
Start: 2024-03-21

## 2024-03-27 RX ORDER — BUPROPION HYDROCHLORIDE 150 MG/1
150 TABLET ORAL DAILY
Qty: 30 TABLET | Refills: 0 | OUTPATIENT
Start: 2024-03-27

## 2024-04-03 RX ORDER — BUPROPION HYDROCHLORIDE 150 MG/1
150 TABLET ORAL DAILY
Qty: 30 TABLET | Refills: 0 | Status: SHIPPED | OUTPATIENT
Start: 2024-04-03

## 2024-04-03 NOTE — TELEPHONE ENCOUNTER
Caller: Payam Carlota L    Relationship: Self    Best call back number:     [phone] (Mobile)       Requested Prescriptions:     buPROPion XL (WELLBUTRIN XL) 150 MG 24 hr tablet  150 mg, Daily          Pharm[facility] where request should be sent: [id number] - [place], [place] - [address] [facility] AT [facility] OF [address] & [place] - [phone] [facility] [phone] FX     Last office visit with prescribing clinician: Visit date not found   Last telemedicine visit with prescribing clinician: Visit date not found   Next office visit with prescribing clinician: Visit date not found     Additional details provided by patient: PATIENT HAS A TWO DAY SUPPLY    Does the patient have less than a 3 day supply:  [x] Yes  [] No    Would you like a call back once the refill request has been completed: [] Yes [] No    If the office needs to give you a call back, can they leave a voicemail: [x] Yes [] No    Grady Wilkerson   04/03/24 08:20 EDT

## 2024-04-10 ENCOUNTER — OFFICE VISIT (OUTPATIENT)
Dept: PODIATRY | Facility: CLINIC | Age: 59
End: 2024-04-10
Payer: COMMERCIAL

## 2024-04-10 VITALS
HEART RATE: 77 BPM | OXYGEN SATURATION: 96 % | SYSTOLIC BLOOD PRESSURE: 134 MMHG | WEIGHT: 240 LBS | HEIGHT: 66 IN | BODY MASS INDEX: 38.57 KG/M2 | TEMPERATURE: 98 F | DIASTOLIC BLOOD PRESSURE: 82 MMHG

## 2024-04-10 DIAGNOSIS — M76.62 ACHILLES TENDINITIS OF LEFT LOWER EXTREMITY: ICD-10-CM

## 2024-04-10 DIAGNOSIS — M76.72 PERONEAL TENDINITIS OF LEFT LOWER LEG: Primary | ICD-10-CM

## 2024-04-10 NOTE — PROGRESS NOTES
[facility] - PODIATRY    Today's Date: 04/10/24    Patient Name: Carlota Hare  MRN: [id number]  CSN: [id number]  PCP: Yue Pena APRN,   Referring Provider: Yue Pena APRN    SUBJECTIVE     Chief Complaint   Patient presents with    Left Foot - Pain, Establish Care     Onset 6 mos ago no known injury  pain lateral aspect of foot and when flexing foot pain up the back of the heel to achilles area.   Saw PCP Jean 24 exam xrays. Given anti inflammatory with no relief.      HPI: Carlota Hare, a 58 y.o.female, presents to clinic.    Patient is a 58-year-old female presenting with pain to her outside of her left foot.  Patient states this majority hurts her when she is walking barefoot.  She says it been going on for several months but it has improved recently.  She also says it bothers her when she is wearing nonsupportive shoes.  She does have occasionally sharp pain in the back of her heel.    Past Medical History:   Diagnosis Date    Anemia     Anxiety     Asthma     CPAP (continuous positive airway pressure) dependence     Depression     H/O Lymphedema     Left arm and leg    History of anemia     History of foreign travel     [place] and [place]    History of hypoglycemia     History of vitamin D deficiency     Hypertension     Knee meniscus pain, left     Migraine     OAB (overactive bladder)     RICHARD (obstructive sleep apnea)     Poor vision      Past Surgical History:   Procedure Laterality Date     SECTION      CHOLECYSTECTOMY      COLONOSCOPY N/A 2023    Procedure: COLONOSCOPY with stool collection;  Surgeon: Leobardo Hodgson MD;  Location: [facility] ENDOSCOPY;  Service: General;  Laterality: N/A;  internal hemmorhoids    DILATATION AND CURETTAGE      ENDOMETRIAL ABLATION      HEMORRHOIDECTOMY  2006    LYMPH NODE DISSECTION      NECK SURGERY  1964    ROTATOR CUFF REPAIR Right 2010    SHOULDER SURGERY Left 2018     Family History   Problem Relation Age of  Onset    Clotting disorder Father     Diabetes Mother     Skin cancer Mother     Anxiety disorder Mother         Anxiety    Arthritis Mother         Rheumatoid    Asthma Mother     Depression Mother     Hyperlipidemia Mother     Anxiety disorder Maternal Grandmother         Anxiety    Diabetes Maternal Aunt     Breast cancer Maternal Aunt     Breast cancer Maternal Aunt     Anxiety disorder Maternal Aunt         Anxiety    Diabetes Maternal Aunt     Hyperlipidemia Maternal Aunt     Anxiety disorder Maternal Aunt         Anxiety    Hyperlipidemia Maternal Aunt     Uterine cancer Maternal Aunt     Hyperlipidemia Maternal Uncle     Diabetes Other     Hyperlipidemia Other     Hypertension Other     Lung cancer Other      Social History     Socioeconomic History    Marital status:      Spouse name: Eddie    Number of children: 1    Years of education: College   Tobacco Use    Smoking status: Never     Passive exposure: Never    Smokeless tobacco: Never   Vaping Use    Vaping status: Never Used   Substance and Sexual Activity    Alcohol use: No    Drug use: No    Sexual activity: Not Currently     Allergies   Allergen Reactions    Codeine Other (See Comments)     She states she gets hyper    Demerol [Meperidine] Rash     Headache also    Hydrocodone-Acetaminophen Rash    Percocet [Oxycodone-Acetaminophen] Rash    Topiramate Other (See Comments)     Rash around her face. Urticaria    Tramadol Unknown (See Comments)     Severe depression when coming off of it    Tylenol [Acetaminophen] Rash     Pt stated  Headache and rash     Current Outpatient Medications   Medication Sig Dispense Refill    buPROPion XL (WELLBUTRIN XL) 150 MG 24 hr tablet Take 1 tablet by mouth Daily. 30 tablet 0    hydrocortisone 2.5 % cream Apply 1 application  topically to the appropriate area as directed 2 (Two) Times a Day. (Patient taking differently: Apply 1 Application topically to the appropriate area as directed As Needed.) 20 g 1     losartan (COZAAR) 25 MG tablet Take 1 tablet by mouth Daily for 180 days. 90 tablet 1    losartan (COZAAR) 50 MG tablet Take 1 tablet by mouth Daily for 180 days. 90 tablet 1    temazepam (RESTORIL) 15 MG capsule Take 1 capsule by mouth Every Night. 30 capsule 5    triamcinolone (KENALOG) 0.1 % ointment APPLY TOPICALLY TO THE AFFECTED AREA TWICE DAILY AS DIRECTED 80 g 2    meloxicam (MOBIC) 15 MG tablet TAKE 1 TABLET BY MOUTH DAILY (Patient not taking: Reported on 4/10/2024) 30 tablet 1     No current facility-administered medications for this visit.     Review of Systems   Constitutional: Negative.    HENT: Negative.     Eyes: Negative.    Respiratory: Negative.     Cardiovascular: Negative.    Gastrointestinal: Negative.    Endocrine: Negative.    Genitourinary: Negative.    Musculoskeletal: Negative.    Skin: Negative.    Allergic/Immunologic: Negative.    Neurological: Negative.    Hematological: Negative.    Psychiatric/Behavioral: Negative.     All other systems reviewed and are negative.      OBJECTIVE     Vitals:    04/10/24 0808   BP: 134/82   Pulse: 77   Temp: 98 °F (36.7 °C)   SpO2: 96%       WBC   Date Value Ref Range Status   11/08/2022 4.72 4.5 - 11.0 10*3/uL Final     RBC   Date Value Ref Range Status   11/08/2022 5.00 4.0 - 5.2 10*6/uL Final     Hemoglobin   Date Value Ref Range Status   11/08/2022 13.8 12.0 - 16.0 g/dL Final     Hematocrit   Date Value Ref Range Status   11/08/2022 43.3 36.0 - 46.0 % Final     MCV   Date Value Ref Range Status   11/08/2022 86.6 80.0 - 100.0 fL Final     MCH   Date Value Ref Range Status   11/08/2022 27.6 26.0 - 34.0 pg Final     MCHC   Date Value Ref Range Status   11/08/2022 31.9 31.0 - 37.0 g/dL Final     RDW   Date Value Ref Range Status   11/08/2022 12.8 12.0 - 16.8 % Final     RDW-SD   Date Value Ref Range Status   08/13/2021 41.6 37.0 - 54.0 fl Final     MPV   Date Value Ref Range Status   11/08/2022 10.1 8.4 - 12.4 fL Final     Platelets   Date Value Ref  Range Status   11/08/2022 225 140 - 440 10*3/uL Final     Neutrophil Rel %   Date Value Ref Range Status   11/08/2022 50.4 45 - 80 % Final     Lymphocyte Rel %   Date Value Ref Range Status   11/08/2022 38.8 15 - 50 % Final     Monocyte Rel %   Date Value Ref Range Status   11/08/2022 6.8 0 - 15 % Final     Eosinophil %   Date Value Ref Range Status   11/08/2022 2.1 0 - 7 % Final     Basophil Rel %   Date Value Ref Range Status   11/08/2022 1.7 0 - 2 % Final     Immature Grans %   Date Value Ref Range Status   11/08/2022 0.2 0.0 - 1.0 % Final     Neutrophils Absolute   Date Value Ref Range Status   11/08/2022 2.40 2.0 - 8.8 10*3/uL Final     Lymphocytes Absolute   Date Value Ref Range Status   11/08/2022 1.83 0.7 - 5.5 10*3/uL Final     Monocytes Absolute   Date Value Ref Range Status   11/08/2022 0.32 0.0 - 1.7 10*3/uL Final     Eosinophils Absolute   Date Value Ref Range Status   11/08/2022 0.10 0.0 - 0.8 10*3/uL Final     Basophils Absolute   Date Value Ref Range Status   11/08/2022 0.08 0.0 - 0.2 10*3/uL Final     Immature Grans, Absolute   Date Value Ref Range Status   11/08/2022 0.01 0.00 - 0.10 10*3/uL Final     nRBC   Date Value Ref Range Status   08/13/2021 0.0 0.0 - 0.2 /100 WBC Final         Lab Results   Component Value Date    GLUCOSE 95 08/13/2021    BUN 16 01/24/2023    CREATININE 0.76 01/24/2023    EGFRIFNONA 60 (L) 08/13/2021    EGFRIFAFRI >60 01/24/2023    BCR 21.1 01/24/2023    K 3.6 01/24/2023    CO2 25 01/24/2023    CALCIUM 9.7 01/24/2023    ALBUMIN 4.0 11/08/2022    LABIL2 1.4 11/08/2022    AST 16 11/08/2022    ALT 17 11/08/2022       Patient seen in no apparent distress.      PHYSICAL EXAM:     Foot/Ankle Exam    GENERAL  Appearance:  appears stated age  Orientation:  AAOx3  Affect:  appropriate  Gait:  unimpaired  Assistance:  independent  Right shoe gear: casual shoe  Left shoe gear: casual shoe    VASCULAR     Right Foot Vascularity   Normal vascular exam    Dorsalis pedis:  2+  Posterior  tibial:  2+  Skin temperature:  warm  Edema grading:  None  CFT:  < 3 seconds  Pedal hair growth:  Present  Varicosities:  none     Left Foot Vascularity   Normal vascular exam    Dorsalis pedis:  2+  Posterior tibial:  2+  Skin temperature:  warm  Edema grading:  None  CFT:  < 3 seconds  Pedal hair growth:  Present  Varicosities:  none     NEUROLOGIC     Right Foot Neurologic   Normal sensation    Light touch sensation: normal  Vibratory sensation: normal  Hot/Cold sensation: normal  Protective Sensation using [place] Monofilament:   Sites intact: 10  Sites tested: 10     Left Foot Neurologic   Normal sensation    Light touch sensation: normal  Vibratory sensation: normal  Hot/Cold sensation:  normal  Protective Sensation using [place] Monofilament:   Sites intact: 10  Sites tested: 10    MUSCULOSKELETAL     Left Foot Musculoskeletal   Tenderness:  (Achilles tendinitis and, insertion of peroneal brevis)    MUSCLE STRENGTH     Right Foot Muscle Strength   Foot dorsiflexion:  4  Foot plantar flexion:  4  Foot inversion:  4  Foot eversion:  4     Left Foot Muscle Strength   Foot dorsiflexion:  4  Foot plantar flexion:  4  Foot inversion:  4  Foot eversion:  4    RANGE OF MOTION     Right Foot Range of Motion   Foot and ankle ROM within normal limits       Left Foot Range of Motion   Foot and ankle ROM within normal limits      DERMATOLOGIC      Right Foot Dermatologic   Skin  Right foot skin is intact.      Left Foot Dermatologic   Skin  Left foot skin is intact.       RADIOLOGY:        No results found.    ASSESSMENT/PLAN     Diagnoses and all orders for this visit:    1. Peroneal tendinitis of left lower leg (Primary)    2. Achilles tendinitis of left lower extremity        Comprehensive lower extremity examination and evaluation was performed.    Recommend supportive shoes and not working barefoot.    Stretching exercises dispensed    Patient to begin stretching exercises and icing in the evening  as tolerated. Discussed compression therapy and resting the extremity.  Anti-inflammatory medication to begin taking if okay by PCP.    Return to clinic as needed    Discussed findings and treatment plan including risks, benefits, and treatment options with patient in detail. Patient agreed with treatment plan.    Medications and allergies reviewed.  Reviewed available lab values along with other pertinent labs.  These were discussed with the patient.    An After Visit Summary was printed and given to the patient at discharge, including (if requested) any available informative/educational handouts regarding diagnosis, treatment, or medications. All questions were answered to patient/family satisfaction. Should symptoms fail to improve or worsen they agree to call or return to clinic or to go to the Emergency Department. Discussed the importance of following up with any needed screening tests/labs/specialist appointments and any requested follow-up recommended by me today. Importance of maintaining follow-up discussed and patient accepts that missed appointments can delay diagnosis and potentially lead to worsening of conditions.    Return if symptoms worsen or fail to improve., or sooner if acute issues arise.    This document has been electronically signed by Marin Delgado DPM on April 10, 2024 09:08 EDT

## 2024-04-12 ENCOUNTER — PATIENT ROUNDING (BHMG ONLY) (OUTPATIENT)
Dept: PODIATRY | Facility: CLINIC | Age: 59
End: 2024-04-12
Payer: COMMERCIAL

## 2024-04-12 NOTE — PROGRESS NOTES
A [employer] message has been sent to the patient for PATIENT ROUNDING with [facility]

## 2024-05-01 RX ORDER — LOSARTAN POTASSIUM 50 MG/1
50 TABLET ORAL DAILY
Qty: 90 TABLET | Refills: 1 | Status: SHIPPED | OUTPATIENT
Start: 2024-05-01 | End: 2024-10-28

## 2024-05-01 RX ORDER — BUPROPION HYDROCHLORIDE 150 MG/1
150 TABLET ORAL DAILY
Qty: 90 TABLET | Refills: 1 | Status: SHIPPED | OUTPATIENT
Start: 2024-05-01

## 2024-05-01 RX ORDER — LOSARTAN POTASSIUM 25 MG/1
25 TABLET ORAL DAILY
Qty: 90 TABLET | Refills: 1 | Status: SHIPPED | OUTPATIENT
Start: 2024-05-01 | End: 2024-10-28

## 2024-05-01 RX ORDER — BUPROPION HYDROCHLORIDE 150 MG/1
150 TABLET ORAL DAILY
Qty: 30 TABLET | Refills: 0 | OUTPATIENT
Start: 2024-05-01

## 2024-05-01 NOTE — TELEPHONE ENCOUNTER
Caller: Carlota Hare YECENIA    Relationship: Self    Best call back number: [phone]    Requested Prescriptions:   Requested Prescriptions     Pending Prescriptions Disp Refills    losartan (COZAAR) 25 MG tablet 90 tablet 1     Sig: Take 1 tablet by mouth Daily for 180 days.    losartan (COZAAR) 50 MG tablet 90 tablet 1     Sig: Take 1 tablet by mouth Daily for 180 days.    buPROPion XL (WELLBUTRIN XL) 150 MG 24 hr tablet 30 tablet 0     Sig: Take 1 tablet by mouth Daily.        Pharmacy where request should be sent: [id number] - [place], [place] - [address] [facility] AT [facility] OF [address] & [place] - [phone] [facility] [phone] FX     Last office visit with prescribing clinician: Visit date not found   Last telemedicine visit with prescribing clinician: Visit date not found   Next office visit with prescribing clinician: 6/6/2024     Additional details provided by patient: PATIENT WAS WITH NEIL VO AND IS REQUESTING REFILLS.     Does the patient have less than a 3 day supply:  [x] Yes  [] No

## 2024-05-17 DIAGNOSIS — R20.2 PARESTHESIA OF HAND, BILATERAL: ICD-10-CM

## 2024-05-17 DIAGNOSIS — M18.0 OSTEOARTHRITIS OF CARPOMETACARPAL (CMC) JOINTS OF BOTH THUMBS, UNSPECIFIED OSTEOARTHRITIS TYPE: ICD-10-CM

## 2024-05-17 RX ORDER — MELOXICAM 15 MG/1
15 TABLET ORAL DAILY
Qty: 30 TABLET | Refills: 1 | Status: SHIPPED | OUTPATIENT
Start: 2024-05-17

## (undated) DEVICE — Device

## (undated) DEVICE — LINER SURG CANSTR SXN S/RIGD 1500CC

## (undated) DEVICE — SOLIDIFIER LIQLOC PLS 1500CC BT

## (undated) DEVICE — Device: Brand: DEFENDO AIR/WATER/SUCTION AND BIOPSY VALVE

## (undated) DEVICE — TRAP,MUCUS SPECIMEN,40CC: Brand: MEDLINE

## (undated) DEVICE — SOL IRRG H2O PL/BG 1000ML STRL

## (undated) DEVICE — SOL IRR NACL 0.9PCT BT 1000ML

## (undated) DEVICE — GLV SURG BIOGEL LTX PF 7 1/2

## (undated) DEVICE — CONN JET HYDRA H20 AUXILIARY DISP